# Patient Record
Sex: MALE | Race: WHITE | NOT HISPANIC OR LATINO | Employment: FULL TIME | URBAN - METROPOLITAN AREA
[De-identification: names, ages, dates, MRNs, and addresses within clinical notes are randomized per-mention and may not be internally consistent; named-entity substitution may affect disease eponyms.]

---

## 2017-01-04 ENCOUNTER — HOSPITAL ENCOUNTER (EMERGENCY)
Facility: CLINIC | Age: 62
Discharge: HOME | End: 2017-01-04
Attending: EMERGENCY MEDICINE | Admitting: EMERGENCY MEDICINE
Payer: COMMERCIAL

## 2017-01-04 ENCOUNTER — TELEPHONE (OUTPATIENT)
Dept: SURGERY | Facility: CLINIC | Age: 62
End: 2017-01-04

## 2017-01-04 VITALS
SYSTOLIC BLOOD PRESSURE: 124 MMHG | TEMPERATURE: 98.3 F | WEIGHT: 270 LBS | HEIGHT: 73 IN | RESPIRATION RATE: 20 BRPM | OXYGEN SATURATION: 94 % | BODY MASS INDEX: 35.78 KG/M2 | DIASTOLIC BLOOD PRESSURE: 82 MMHG | HEART RATE: 76 BPM

## 2017-01-04 DIAGNOSIS — R59.9 ENLARGED LYMPH NODES: Primary | ICD-10-CM

## 2017-01-04 DIAGNOSIS — R22.1 LOCALIZED SWELLING, MASS AND LUMP, NECK: ICD-10-CM

## 2017-01-04 LAB
ALBUMIN SERPL-MCNC: 3.7 G/DL (ref 3.4–5)
ALP SERPL-CCNC: 81 U/L (ref 40–150)
ALT SERPL W P-5'-P-CCNC: 44 U/L (ref 0–70)
ANION GAP SERPL CALCULATED.3IONS-SCNC: 9 MMOL/L (ref 3–14)
AST SERPL W P-5'-P-CCNC: 22 U/L (ref 0–45)
BASOPHILS # BLD AUTO: 0 10E9/L (ref 0–0.2)
BASOPHILS NFR BLD AUTO: 0.3 %
BILIRUB SERPL-MCNC: 1.6 MG/DL (ref 0.2–1.3)
BUN SERPL-MCNC: 18 MG/DL (ref 7–30)
CALCIUM SERPL-MCNC: 8.5 MG/DL (ref 8.5–10.1)
CHLORIDE SERPL-SCNC: 102 MMOL/L (ref 94–109)
CO2 SERPL-SCNC: 27 MMOL/L (ref 20–32)
CREAT SERPL-MCNC: 0.88 MG/DL (ref 0.66–1.25)
DIFFERENTIAL METHOD BLD: NORMAL
EOSINOPHIL # BLD AUTO: 0 10E9/L (ref 0–0.7)
EOSINOPHIL NFR BLD AUTO: 0.2 %
ERYTHROCYTE [DISTWIDTH] IN BLOOD BY AUTOMATED COUNT: 12.9 % (ref 10–15)
GFR SERPL CREATININE-BSD FRML MDRD: 87 ML/MIN/1.7M2
GLUCOSE SERPL-MCNC: 116 MG/DL (ref 70–99)
HCT VFR BLD AUTO: 42.1 % (ref 40–53)
HGB BLD-MCNC: 14.4 G/DL (ref 13.3–17.7)
IMM GRANULOCYTES # BLD: 0 10E9/L (ref 0–0.4)
IMM GRANULOCYTES NFR BLD: 0.2 %
INR PPP: 1.12 (ref 0.86–1.14)
LYMPHOCYTES # BLD AUTO: 1.5 10E9/L (ref 0.8–5.3)
LYMPHOCYTES NFR BLD AUTO: 15 %
MCH RBC QN AUTO: 31.6 PG (ref 26.5–33)
MCHC RBC AUTO-ENTMCNC: 34.2 G/DL (ref 31.5–36.5)
MCV RBC AUTO: 93 FL (ref 78–100)
MONOCYTES # BLD AUTO: 1 10E9/L (ref 0–1.3)
MONOCYTES NFR BLD AUTO: 9.6 %
NEUTROPHILS # BLD AUTO: 7.5 10E9/L (ref 1.6–8.3)
NEUTROPHILS NFR BLD AUTO: 74.7 %
NRBC # BLD AUTO: 0 10*3/UL
NRBC BLD AUTO-RTO: 0 /100
PLATELET # BLD AUTO: 268 10E9/L (ref 150–450)
POTASSIUM SERPL-SCNC: 3.5 MMOL/L (ref 3.4–5.3)
PROT SERPL-MCNC: 7.7 G/DL (ref 6.8–8.8)
RBC # BLD AUTO: 4.55 10E12/L (ref 4.4–5.9)
SODIUM SERPL-SCNC: 138 MMOL/L (ref 133–144)
WBC # BLD AUTO: 10 10E9/L (ref 4–11)

## 2017-01-04 PROCEDURE — 80053 COMPREHEN METABOLIC PANEL: CPT | Performed by: EMERGENCY MEDICINE

## 2017-01-04 PROCEDURE — 99284 EMERGENCY DEPT VISIT MOD MDM: CPT | Mod: Z6 | Performed by: EMERGENCY MEDICINE

## 2017-01-04 PROCEDURE — 85610 PROTHROMBIN TIME: CPT | Performed by: EMERGENCY MEDICINE

## 2017-01-04 PROCEDURE — 85025 COMPLETE CBC W/AUTO DIFF WBC: CPT | Performed by: EMERGENCY MEDICINE

## 2017-01-04 PROCEDURE — 99283 EMERGENCY DEPT VISIT LOW MDM: CPT | Mod: 25 | Performed by: EMERGENCY MEDICINE

## 2017-01-04 PROCEDURE — 31525 DX LARYNGOSCOPY EXCL NB: CPT | Performed by: EMERGENCY MEDICINE

## 2017-01-04 ASSESSMENT — ENCOUNTER SYMPTOMS
MYALGIAS: 0
VOMITING: 0
NAUSEA: 0
COUGH: 0
SHORTNESS OF BREATH: 0
FEVER: 0
ABDOMINAL PAIN: 0

## 2017-01-04 NOTE — CONSULTS
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 He has been following with Thoracic Surgery for this papillomatosis, but has not needed an ablative procedure since March of 2016. Today he presents to the ED from his PCP appointment for a large lump on his left neck. Patient reports that he just noticed the lumps in the last 5 days. He has had no associated symptoms, no fevers, cough, colds, weight loss, or sick contacts. These lumps do not hurt, and are not tender to touch, or draining anything.    He did have a CT scan back on 12/21/2016 with Thoracic Surgery to plan for his upcoming surgery on 1/9/2017, evaluating for is papillomatosis. There were two necrotic lymph nodes in the left neck at this time, but the patient reports he was not informed of this. Now he has ha larger mass, which prompted his PCP to get another CT when he was seen in clinic yesterday. This CT showed a large interval increase in the size of these two nodes, as well as two new nodes that are now enlarged in lower levels of his left neck. His PCP told him to make an ENT appointment for this week, and sent him out on augmentin. The patient called his Thoracic surgeon with the news, and was told to come to the ED for evaluation.     Past Medical History   Diagnosis Date     Hypertension      Laryngeal polyp      Tracheostomy in place      #6 Ruben     Thyroid disease      hypothyroidism     PONV (postoperative nausea and vomiting)      Pulmonary nodules      Papillomatosis of trachea        Past Surgical History   Procedure Laterality Date     Orthopedic surgery       left knee surgery     Orthopedic surgery       back surgery     Orthopedic surgery       bilateral shoulder  surgery     Laser ktp bronchoscopy, direct laryngoscopy, combined  4/11/2012     Procedure:COMBINED LASER KTP BRONCHOSCOPY, DIRECT LARYNGOSCOPY;  Direct Laryngoscopy, Bronchoscopy  with KTP Laser and Excision of Tracheal Papilloma *Latex Safe Room*; Surgeon:MEHUL FUENTES; Location:[facility]     Laser co2 laryngoscopy  6/6/2011     Procedure:LASER CO2 LARYNGOSCOPY; Micro Direct Laryngoscopy with Vocal Cord Stripping WITH CO2 LASER STANDBY.; Surgeon:MEHUL FUENTES; Location:[facility]     Laryngectomy  1982     Laser ktp bronchoscopy  7/25/2014     Procedure: LASER KTP BRONCHOSCOPY;  Surgeon: Mehul Fuentes MD;  Location: [facility]     Laser ktp bronchoscopy, direct laryngoscopy, combined N/A 4/8/2015     Procedure: COMBINED LASER KTP BRONCHOSCOPY, DIRECT LARYNGOSCOPY;  Surgeon: Mehul Fuentes MD;  Location: [facility]     Bronchoscopy flexible,l cryobiopsy N/A 1/7/2016     Procedure: BRONCHOSCOPY FLEXIBLE, CRYOBIOPSY;  Surgeon: Derik Fuentes MD;  Location: [facility]     Bronchoscopy flexible,l cryobiopsy N/A 1/28/2016     Procedure: BRONCHOSCOPY FLEXIBLE, CRYOBIOPSY;  Surgeon: Derik Fuentes MD;  Location: [facility]     Bronchoscopy flexible,l cryobiopsy N/A 3/14/2016     Procedure: BRONCHOSCOPY FLEXIBLE, CRYOBIOPSY;  Surgeon: Derik Fuentes MD;  Location: [facility] OR   History of thyroidectomy 25 years ago, unclear if nina- or total. Patient reports the pathology came back as benign.    Current Outpatient Prescriptions   Medication Sig Dispense Refill     AMLODIPINE BESYLATE PO Take 5 mg by mouth daily       Escitalopram Oxalate (LEXAPRO PO) Take 10 mg by mouth daily       METOPROLOL SUCCINATE ER PO Take 150 mg by mouth daily        levothyroxine (SYNTHROID, LEVOTHROID) 112 MCG tablet Take 224 mcg by mouth daily.       order for DME Equipment being ordered:    Trach supplies:   Tracheostomy tube 6 mm XLT cuffless (dispense 1) with disposable inner cannulas (dispense 40) Ref number  "[id number]  Thermovent Heat and moisture exchanger, ref [id number] (dispense 40)  14 French suction catheters for trach suctioning (dispense 1 Case (50) )      Treatment Diagnosis: Papilloma tracheal papillomatosis 1 Month 11     albuterol (2.5 MG/3ML) 0.083% nebulizer solution Take 1 vial (2.5 mg) by nebulization every 6 hours as needed for shortness of breath / dyspnea or wheezing 360 mL 0     order for DME Equipment being ordered: Nebulizer 1 Device 0     oxyCODONE (ROXICODONE) 5 MG immediate release tablet Take 1-2 tablets (5-10 mg) by mouth every 4 hours as needed for pain or other (Moderate to Severe) 20 tablet 0     order for DME Equipment being ordered: Suction machine and suction catheters for trach. Please dispense 1 suction machine and 30 catheters with 3 refills 30 Device 3     lisinopril-hydrochlorothiazide (PRINZIDE,ZESTORETIC) 20-25 MG per tablet Take 1 tablet by mouth daily.          No Known Allergies    Social History     Social History     Marital Status:      Spouse Name: N/A     Number of Children: N/A     Years of Education: N/A     Occupational History     Not on file.     Social History Main Topics     Smoking status: Never Smoker      Smokeless tobacco: Never Used     Alcohol Use: No     Drug Use: No     Sexual Activity: Not on file     Other Topics Concern     Not on file     Social History Narrative       No family history on file.    ROS: 12 point review of systems is negative unless noted in HPI.    PHYSICAL EXAM:  General: laying in bed, no acute distress  /80 mmHg  Pulse 76  Temp(Src) 98.3  F (36.8  C) (Oral)  Resp 20  Ht 1.854 m (6' 1\")  Wt 122.471 kg (270 lb)  BMI 35.63 kg/m2  SpO2 97%  HEAD: normocephalic, atraumatic  Face: symmetrical, CN VII intact bilaterally (HB 1). Sensation V1-V3 intact and equal bilaterally. Notable fullness at left jaw line.  Eyes: EOMI without spontaneous or gaze evoked nystagmus, PERRL, clear sclera  Ears: no tragal tenderness, external ear " canal open and clear bilaterally, TM clear on right, unable to assess 2/2 cerumen on left  Nose: no anterior drainage, intact and midline septum without perforation or hematoma   Mouth: moist, no ulcers, no jaw or tooth tenderness, tongue midline and symmetric  Oropharynx: tonsils within normal limits, uvula midline, no oropharyngeal erythema  Neck: trachea midline. Two large masses over left SCM close together in anterior triangle of neck. Non-tender to palpation.   Neuro: cranial nerves 2-12 grossly intact    FIBEROPTIC ENDOSCOPY:  Due to concern for unseen lesion in pharynx/larynx, fiberoptic laryngoscopy was indicated. After obtaining verbal consent, the fiberoptic laryngoscope was passed under endoscopic vision through the left nasal passage. The turbinates were normal. The inferior and middle meati were clear bilaterally without purulence, masses, or polyps. The R nasopharynx wall shows a possible polyp. The eustachian tubes were clear. The soft palate appeared normal with good mobility. The epiglottis was sharp, and the visualized portion of the vallecula was clear. The larynx was clear with good movement of L vocal cord. R vocal cord appeared more stiff, but there is obvious scaring from previous papilloma removal. The arytenoids were clear, and there was no pooling in the hypopharynx.  The  fiberoptic laryngoscope was also passed down his trach, which showed one pedunculated papilloma on the posterior wall of the trachea near the end of his trach. His trach was advanced in (as his ties were loose and there was room to advance), and the polyp was obscured behind the trach, leaving a clear open airway.     ROUTINE IP LABS (Last four results)  BMP  Recent Labs  Lab 01/04/17  1539      POTASSIUM 3.5   CHLORIDE 102   SHAJI 8.5   CO2 27   BUN 18   CR 0.88   *     CBC  Recent Labs  Lab 01/04/17  1539   WBC 10.0   RBC 4.55   HGB 14.4   HCT 42.1   MCV 93   MCH 31.6   MCHC 34.2   RDW 12.9         INR  Recent Labs  Lab 01/04/17  1539   INR 1.12       Imaging:  CT head[facility]neck [facility] 1/3/2017 [facility];   1. Since previous neck CT 12/21/2016, there has been interval increase in   the size of the necrotic left-sided level 2A and level 3 lymph nodes as   described above. In addition, there are several mild abnormally enlarged   enlarged left-sided level 4 and level 5 lymph nodes which are also new   since the previous neck CT. There is asymmetric prominence of the left   tonsillar fossa. Please correlate with direct visualization. ENT consult   and probable tissue sampling would be valuable.   2. There is extensive inflammatory soft tissue associated with the   necrotic left level 2A and 3 lymph nodes with the inflammatory changes   involving the soft tissues anterior to the left sternocleidomastoid,   posterior to the left submandibular gland and along the left carotid space   as well as the lateral left  space. Inferiorly, these   inflammatory soft tissues extend along the anterior left-sided soft tissues  down to the level of the thyroid cartilage. The findings are suspicious   for superimposed infectious process. No discrete drainable fluid   collections.  3. Stable 3 mm pleural-based nodule in the right lung apex.    CT head/neck from 12/21/2016;  Impression:  1. Two new necrotic lymphadenopathy in the left neck.  2. New 9 mm nodule in the right upper lung lobe.  3. Unchanged positioning of the tracheostomy tube with continued  thickening of the tracheal wall below the tracheostomy tube.  4. Nonspecific soft tissue thickening in the right carotid space  surrounding the carotid artery.     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 These are  most likely 2/2 infection or malignancy. The patient does not have infectious type symptoms, and malignancy must be ruled out. Having tracheal papillomatosis does increase his risk for squamous cell carcinoma.     -We recommend Ultrasound guided FNA, order placed for you in discharge navigator. The nodes appear to be behind his submandibular salivary gland and SCM, so it would likely be difficult to get a meaningful FNA without the use of U/S. Patient reports he has off of work this week, so hopefully we can expedite this for him, given the convenience for him and the rapid enlargement.  -Mr. Gayle should follow up in Dr. Diana's clinic next week, likely Wed or Friday.   -No need for further imaging at this time  -This is not a contraindication for surgery on Monday with Thoracic from our standpoint    Adriana Aaron MD PGY1  Otolaryngology-Head & Neck Surgery  Please page ENT with questions by dialing [phone] and entering job code [phone] when prompted.

## 2017-01-04 NOTE — CONSULTS
[facility] Surgery Consultation    Alex Gayle MRN# [id number]   Age: 61 year old YOB: 1955     Date of Admission:  1/4/2017    Date of Consult:   1/04/17    Reason for consult: Enlarging neck lymph nodes       Requesting service: ED                   Assessment and Plan:   Assessment:   60 y/o male with tracheal papillomatosis s/p tracheostomy and serial ablations here with enlarging neck lymph nodes over past 5 days. Imaging favoring infectious in etiology but no clinical sources of infection. No immediate concerns for airway compromise.        Plan:   - No indication for urgent intervention for airway  - Defer to ENT for workup of enlarging lymph nodes  - May postpone ablation scheduled on 1/9 pending etiology of enlarging lymph nodes    Discussed with fellow, Dr. Bradley            Chief Complaint:   Enlarging neck lymph nodes         History of Present Illness:   60 y/o male with tracheal papillomatosis s/p tracheostomy and serial ablations here with enlarging neck lymph nodes over past 5 days. Initially with 2 necrotic lymph nodes found on CT on 12/21/2016 but asymptomatic clinically. 5 days ago started having left sided submandibular swelling that initially improved but then got worse over past few days. Denies any pain/tenderness over the area. CT 1/2/17 showed enlarging necrotic nodes as well as other new enlarged nodes favoring infectious etiology. Denies any sick contacts, recent illness. No fever/chills, congestion, headache. Denies SOB or chest pain. No history of salivary stones. No rashes. Immunizations up to date. No other sign of symptoms of infection.          Past Medical History:     Past Medical History   Diagnosis Date     Hypertension      Laryngeal polyp      Tracheostomy in place      #6 Ruben     Thyroid disease      hypothyroidism     PONV (postoperative nausea and vomiting)      Pulmonary nodules      Papillomatosis of trachea              Past Surgical History:      Past Surgical History   Procedure Laterality Date     Orthopedic surgery       left knee surgery     Orthopedic surgery       back surgery     Orthopedic surgery       bilateral shoulder surgery     Laser ktp bronchoscopy, direct laryngoscopy, combined  4/11/2012     Procedure:COMBINED LASER KTP BRONCHOSCOPY, DIRECT LARYNGOSCOPY;  Direct Laryngoscopy, Bronchoscopy  with KTP Laser and Excision of Tracheal Papilloma *Latex Safe Room*; Surgeon:MEHUL FUENTES; Location:[facility]     Laser co2 laryngoscopy  6/6/2011     Procedure:LASER CO2 LARYNGOSCOPY; Micro Direct Laryngoscopy with Vocal Cord Stripping WITH CO2 LASER STANDBY.; Surgeon:MEHUL FUENTES; Location:[facility]     Laryngectomy  1982     Laser ktp bronchoscopy  7/25/2014     Procedure: LASER KTP BRONCHOSCOPY;  Surgeon: Mehul Fuentes MD;  Location: [facility]     Laser ktp bronchoscopy, direct laryngoscopy, combined N/A 4/8/2015     Procedure: COMBINED LASER KTP BRONCHOSCOPY, DIRECT LARYNGOSCOPY;  Surgeon: Mehul Fuentes MD;  Location: [facility] OR     Bronchoscopy flexible,l cryobiopsy N/A 1/7/2016     Procedure: BRONCHOSCOPY FLEXIBLE, CRYOBIOPSY;  Surgeon: Derik Fuentes MD;  Location: [facility]     Bronchoscopy flexible,l cryobiopsy N/A 1/28/2016     Procedure: BRONCHOSCOPY FLEXIBLE, CRYOBIOPSY;  Surgeon: Derik Fuentes MD;  Location: [facility]     Bronchoscopy flexible,l cryobiopsy N/A 3/14/2016     Procedure: BRONCHOSCOPY FLEXIBLE, CRYOBIOPSY;  Surgeon: Derik Fuentes MD;  Location: [facility] OR             Social History:     Social History   Substance Use Topics     Smoking status: Never Smoker      Smokeless tobacco: Never Used     Alcohol Use: No             Family History:   No family history on file.             Allergies:   No Known Allergies          Medications:     No current facility-administered medications for this encounter.     Current Outpatient Prescriptions   Medication Sig     AMLODIPINE BESYLATE PO Take 5 mg  by mouth daily     Escitalopram Oxalate (LEXAPRO PO) Take 10 mg by mouth daily     METOPROLOL SUCCINATE ER PO Take 150 mg by mouth daily      levothyroxine (SYNTHROID, LEVOTHROID) 112 MCG tablet Take 224 mcg by mouth daily.     order for DME Equipment being ordered:    Trach supplies:   Tracheostomy tube 6 mm XLT cuffless (dispense 1) with disposable inner cannulas (dispense 40) Ref number [id number]  Thermovent Heat and moisture exchanger, ref [id number] (dispense 40)  14 French suction catheters for trach suctioning (dispense 1 Case (50) )      Treatment Diagnosis: Papilloma tracheal papillomatosis     albuterol (2.5 MG/3ML) 0.083% nebulizer solution Take 1 vial (2.5 mg) by nebulization every 6 hours as needed for shortness of breath / dyspnea or wheezing     order for DME Equipment being ordered: Nebulizer     oxyCODONE (ROXICODONE) 5 MG immediate release tablet Take 1-2 tablets (5-10 mg) by mouth every 4 hours as needed for pain or other (Moderate to Severe)     order for DME Equipment being ordered: Suction machine and suction catheters for trach. Please dispense 1 suction machine and 30 catheters with 3 refills     lisinopril-hydrochlorothiazide (PRINZIDE,ZESTORETIC) 20-25 MG per tablet Take 1 tablet by mouth daily.     Facility-Administered Medications Ordered in Other Encounters   Medication     ondansetron (ZOFRAN) injection               Review of Systems:   Negative except for mentioned in HPI          Physical Exam:   All vitals have been reviewed  Temp:  [98.3  F (36.8  C)] 98.3  F (36.8  C)  Pulse:  [76] 76  Resp:  [20] 20  BP: (124)/(80-82) 124/82 mmHg  SpO2:  [97 %] 97 %  No intake or output data in the 24 hours ending 01/04/17 0234  Physical Exam:  Gen: NAD, AOx3  HEENT: large left sided submandibular mass, non-tender, no overlying skin changes. Right sided nodes palpable but not significantly enlarged. Midline trach in place.  Chest: RRR, NLB on RA  Abdomen: S, ND, NT          Data:   All laboratory data  reviewed    Results:  BMP  Recent Labs  Lab 01/04/17  1539      POTASSIUM 3.5   CHLORIDE 102   CO2 27   BUN 18   CR 0.88   *     CBC  Recent Labs  Lab 01/04/17  1539   WBC 10.0   HGB 14.4        LFT  Recent Labs  Lab 01/04/17  1539   AST 22   ALT 44   ALKPHOS 81   BILITOTAL 1.6*   ALBUMIN 3.7   INR 1.12       Recent Labs  Lab 01/04/17  1539   *       Imaging:  CT Soft Tissue Neck 12/21/16:  Impression:  1. Two new necrotic lymphadenopathy in the left neck.  2. New 9 mm nodule in the right upper lung lobe.  3. Unchanged positioning of the tracheostomy tube with continued  thickening of the tracheal wall below the tracheostomy tube.  4. Nonspecific soft tissue thickening in the right carotid space  surrounding the carotid artery. Allograft    [Access Center: New necrotic lymph nodes in the neck and a new lung  Nodule]    CT Soft Tissue Neck 01/03/17 at [facility]:  CONCLUSION:  1. Since previous neck CT 12/21/2016, there has been interval increase in   the size of the necrotic left-sided level 2A and level 3 lymph nodes as   described above. In addition, there are several mild abnormally enlarged   enlarged left-sided level 4 and level 5 lymph nodes which are also new   since the previous neck CT. There is asymmetric prominence of the left   tonsillar fossa. Please correlate with direct visualization. ENT consult   and probable tissue sampling would be valuable.   2. There is extensive inflammatory soft tissue associated with the   necrotic left level 2A and 3 lymph nodes with the inflammatory changes   involving the soft tissues anterior to the left sternocleidomastoid,   posterior to the left submandibular gland and along the left carotid space   as well as the lateral left  space. Inferiorly, these   inflammatory soft tissues extend along the anterior left-sided soft tissues  down to the level of the thyroid cartilage. The findings are suspicious   for superimposed infectious  process. No discrete drainable fluid   collections.  3. Stable 3 mm pleural-based nodule in the right lung apex.       Teo Seymour MD

## 2017-01-04 NOTE — TELEPHONE ENCOUNTER
Patient's wife called me today.  Farzad is scheduled to have a bronchoscopy on Monday for evaluation and treatment of airway papillomas.  Also an EGD because he's been having symptoms of dysphagia.      Recent CT done prior to procedure showed some enlarging adenopathy in the in the neck.  Reviewed with Dr. Fuentes, plan was to schedule an appt for patient with ENT.    Shefali called today because Farzad has significant swelling on the side of his neck and under his chin.  She said these areas are larger than golf balls. He saw his PCP who started him on abx and advised going to [facility].  They are on their way.  ENT has been notified of possible consult to evaluate neck adenopathy.

## 2017-01-04 NOTE — ED NOTES
Pt arrives with left neck and throat swelling that is getting worse. Was seen here on 12/21/16 for a Soft tissue CT scan. Pt was also seen at [facility] yesterday for an MRI.   Has Surgery scheduled on 01/09/16 for Papillomas.

## 2017-01-04 NOTE — ED AVS SNAPSHOT
[facility], [place], Emergency Department    [address] [place] [address]    Phone:  [phone]                                       Alex Gayle   MRN: [id number]    Department:  [facility], Emergency Department   Date of Visit:  1/4/2017           After Visit Summary Signature Page     I have received my discharge instructions, and my questions have been answered. I have discussed any challenges I see with this plan with the nurse or doctor.    ..........................................................................................................................................  Patient/Patient Representative Signature      ..........................................................................................................................................  Patient Representative Print Name and Relationship to Patient    ..................................................               ................................................  Date                                            Time    ..........................................................................................................................................  Reviewed by Signature/Title    ...................................................              ..............................................  Date                                                            Time

## 2017-01-04 NOTE — ED PROVIDER NOTES
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 Patient has a long history of tracheal papillomatosis and follows with Dr. Fuentes. He is scheduled for routine EGD's for his papillomatosis. His most recent procedure was in March, 2016 and he is scheduled for upcoming EGD with cryoablation of papillomas with Dr. Fuentes on Monday (5 days from now). Patient states about a week ago he noticed some swelling on the left side of his neck. He states the swelling has gotten progressively worse since that time. Patient was seen at [facility] yesterday and had a CT soft tissue of the neck which showed several enlarging necrotic left sided lymph nodes compared to previous CT on 12/21 (impression below). Patient states he was also prescribed Augmentin at that time. Patient states the swelling has gotten progressively worse since yesterday. He denies ever having had this problem before. Patient states he has not seen Dr. Fuentes or ENT for this complaint, though states he contacted Dr. Fuentes's office today and was instructed to present to the ED. Patient denies any shortness of breath, cough or chest pain. No recent fevers, body aches, abdominal pain, nausea, vomiting or other complaints of swelling.      CT Soft Tissue Neck 12/21/16:  Impression:  1. Two new necrotic lymphadenopathy in the left neck.  2. New 9 mm nodule in the right upper lung lobe.  3. Unchanged positioning of the tracheostomy tube with continued  thickening of the tracheal wall below the tracheostomy tube.  4. Nonspecific soft tissue thickening in the right carotid space  surrounding the carotid artery. Allograft    [Access Center: New necrotic lymph nodes in the neck and a new  lung  Nodule]    CT Soft Tissue Neck 01/03/17 at [facility]:  CONCLUSION:  1. Since previous neck CT 12/21/2016, there has been interval increase in   the size of the necrotic left-sided level 2A and level 3 lymph nodes as   described above. In addition, there are several mild abnormally enlarged   enlarged left-sided level 4 and level 5 lymph nodes which are also new   since the previous neck CT. There is asymmetric prominence of the left   tonsillar fossa. Please correlate with direct visualization. ENT consult   and probable tissue sampling would be valuable.   2. There is extensive inflammatory soft tissue associated with the   necrotic left level 2A and 3 lymph nodes with the inflammatory changes   involving the soft tissues anterior to the left sternocleidomastoid,   posterior to the left submandibular gland and along the left carotid space   as well as the lateral left  space. Inferiorly, these   inflammatory soft tissues extend along the anterior left-sided soft tissues  down to the level of the thyroid cartilage. The findings are suspicious   for superimposed infectious process. No discrete drainable fluid   collections.  3. Stable 3 mm pleural-based nodule in the right lung apex.     I have reviewed the Medications, Allergies, Past Medical and Surgical History, and Social History in the [employer] system.    Past Medical History   Diagnosis Date     Hypertension      Laryngeal polyp      Tracheostomy in place      #6 Ruben     Thyroid disease      hypothyroidism     PONV (postoperative nausea and vomiting)      Pulmonary nodules      Papillomatosis of trachea        Past Surgical History   Procedure Laterality Date     Orthopedic surgery       left knee surgery     Orthopedic surgery       back surgery     Orthopedic surgery       bilateral shoulder surgery     Laser ktp bronchoscopy, direct laryngoscopy, combined  4/11/2012     Procedure:COMBINED LASER KTP BRONCHOSCOPY, DIRECT LARYNGOSCOPY;  Direct  Laryngoscopy, Bronchoscopy  with KTP Laser and Excision of Tracheal Papilloma *Late[facility] Safe Room*; Surgeon:MEHUL FUENTES; Location:[facility] OR     Laser co2 laryngoscopy  6/6/2011     Procedure:LASER CO2 LARYNGOSCOPY; Micro Direct Laryngoscopy with Vocal Cord Stripping WITH CO2 LASER STANDBY.; Surgeon:MEHUL FUENTES; Location:[facility] OR     Laryngectomy  1982     Laser ktp bronchoscopy  7/25/2014     Procedure: LASER KTP BRONCHOSCOPY;  Surgeon: Mehul Fuentes MD;  Location: [facility] OR     Laser ktp bronchoscopy, direct laryngoscopy, combined N/A 4/8/2015     Procedure: COMBINED LASER KTP BRONCHOSCOPY, DIRECT LARYNGOSCOPY;  Surgeon: Mehul Fuentes MD;  Location: [facility] OR     Bronchoscopy flexible,l cryobiopsy N/A 1/7/2016     Procedure: BRONCHOSCOPY FLEXIBLE, CRYOBIOPSY;  Surgeon: Derik Fuentes MD;  Location: [facility] OR     Bronchoscopy flexible,l cryobiopsy N/A 1/28/2016     Procedure: BRONCHOSCOPY FLEXIBLE, CRYOBIOPSY;  Surgeon: Derik Fuentes MD;  Location: [facility] OR     Bronchoscopy flexible,l cryobiopsy N/A 3/14/2016     Procedure: BRONCHOSCOPY FLEXIBLE, CRYOBIOPSY;  Surgeon: Derik Fuentes MD;  Location: [facility] OR       No family history on file.    Social History   Substance Use Topics     Smoking status: Never Smoker      Smokeless tobacco: Never Used     Alcohol Use: No     No current facility-administered medications for this encounter.     Current Outpatient Prescriptions   Medication     AMLODIPINE BESYLATE PO     Escitalopram Oxalate (LEXAPRO PO)     METOPROLOL SUCCINATE ER PO     levothyroxine (SYNTHROID, LEVOTHROID) 112 MCG tablet     order for DME     albuterol (2.5 MG/3ML) 0.083% nebulizer solution     order for DME     oxyCODONE (ROXICODONE) 5 MG immediate release tablet     order for DME     lisinopril-hydrochlorothiazide (PRINZIDE,ZESTORETIC) 20-25 MG per tablet     Facility-Administered Medications Ordered in Other Encounters   Medication     ondansetron (ZOFRAN)  "injection      No Known Allergies    Review of Systems   Constitutional: Negative for fever.   HENT:        Left sided neck and throat swelling   Respiratory: Negative for cough and shortness of breath.    Cardiovascular: Negative for chest pain and leg swelling.   Gastrointestinal: Negative for nausea, vomiting and abdominal pain.   Musculoskeletal: Negative for myalgias.   All other systems reviewed and are negative.      Physical Exam   BP: 124/80 mmHg  Pulse: 76  Temp: 98.3  F (36.8  C)  Resp: 20  Height: 185.4 cm (6' 1\")  Weight: 122.471 kg (270 lb)  SpO2: 97 %  Physical Exam   Constitutional: He is oriented to person, place, and time. He appears well-developed and well-nourished.   HENT:   Head: Normocephalic and atraumatic.   Neck: Normal range of motion.   Midline tracheostomy;  Large left sided lateral neck mass; non tender to touch; non palpable below chin but some fullness;    Cardiovascular: Normal rate, regular rhythm and normal heart sounds.    Pulmonary/Chest: Effort normal. No respiratory distress. He has no wheezes.   Abdominal: Soft. He exhibits no distension. There is no tenderness. There is no rebound.   Musculoskeletal: He exhibits no edema or tenderness.   Neurological: He is alert and oriented to person, place, and time.   Skin: Skin is warm.   Psychiatric: He has a normal mood and affect. His behavior is normal. Thought content normal.       ED Course   Procedures   3:39 PM  The patient was seen and examined by Dr. Aponte in Room ED17.              Critical Care time:  none         Results for orders placed or performed during the hospital encounter of 01/04/17   CBC with platelets differential   Result Value Ref Range    WBC 10.0 4.0 - 11.0 10e9/L    RBC Count 4.55 4.4 - 5.9 10e12/L    Hemoglobin 14.4 13.3 - 17.7 g/dL    Hematocrit 42.1 40.0 - 53.0 %    MCV 93 78 - 100 fl    MCH 31.6 26.5 - 33.0 pg    MCHC 34.2 31.5 - 36.5 g/dL    RDW 12.9 10.0 - 15.0 %    Platelet Count 268 150 - 450 10e9/L "    Diff Method Automated Method     % Neutrophils 74.7 %    % Lymphocytes 15.0 %    % Monocytes 9.6 %    % Eosinophils 0.2 %    % Basophils 0.3 %    % Immature Granulocytes 0.2 %    Nucleated RBCs 0 0 /100    Absolute Neutrophil 7.5 1.6 - 8.3 10e9/L    Absolute Lymphocytes 1.5 0.8 - 5.3 10e9/L    Absolute Monocytes 1.0 0.0 - 1.3 10e9/L    Absolute Eosinophils 0.0 0.0 - 0.7 10e9/L    Absolute Basophils 0.0 0.0 - 0.2 10e9/L    Abs Immature Granulocytes 0.0 0 - 0.4 10e9/L    Absolute Nucleated RBC 0.0    INR   Result Value Ref Range    INR 1.12 0.86 - 1.14   Comprehensive metabolic panel   Result Value Ref Range    Sodium 138 133 - 144 mmol/L    Potassium 3.5 3.4 - 5.3 mmol/L    Chloride 102 94 - 109 mmol/L    Carbon Dioxide 27 20 - 32 mmol/L    Anion Gap 9 3 - 14 mmol/L    Glucose 116 (H) 70 - 99 mg/dL    Urea Nitrogen 18 7 - 30 mg/dL    Creatinine 0.88 0.66 - 1.25 mg/dL    GFR Estimate 87 >60 mL/min/1.7m2    GFR Estimate If Black >90   GFR Calc   >60 mL/min/1.7m2    Calcium 8.5 8.5 - 10.1 mg/dL    Bilirubin Total 1.6 (H) 0.2 - 1.3 mg/dL    Albumin 3.7 3.4 - 5.0 g/dL    Protein Total 7.7 6.8 - 8.8 g/dL    Alkaline Phosphatase 81 40 - 150 U/L    ALT 44 0 - 70 U/L    AST 22 0 - 45 U/L     Medications - No data to display           Labs Ordered and Resulted from Time of ED Arrival Up to the Time of Departure from the ED - No data to display    Assessments & Plan (with Medical Decision Making)  Patient is a 61 year old male with a history of recurrent papillomas who follows closely with Dr. Fuentes of thoracic surgery who presented to the ER due to new swelling and fullness in his left lateral neck. Patient was seen by the thoracic surgery service who does not recommend any intervention at this time. They agreed with following through on the date of surgery on the 9th. Patient was also seen by ENT including the attending. They reviewed patient's MRI that was done yesterday at [facility]. At this  time they are not worried about his airway, there is not any emergency procedure that needs to be performed. They do recommend follow up next week for an FNA biopsy of his neck mass. Patient is well-appearing with no signs of any respiratory distress. Patient will be discharged home with instructions to follow-up as recommended. Patient stable for discharge.      This part of the medical record was transcribed by Bogdan Calderon, Medical Scribe, from a dictation done by Vinita Aponte MD.    I have reviewed the nursing notes.  I have reviewed the findings, diagnosis, plan and need for follow up with the patient.    New Prescriptions    No medications on file       Final diagnoses:   Localized swelling, mass and lump, neck   I, Roxann Song, am serving as a trained medical scribe to document services personally performed by Vinita Aponte MD, based on the provider's statements to me.      IVinita MD, was physically present and have reviewed and verified the accuracy of this note documented by Roxann Song.       1/4/2017   [facility], [place], EMERGENCY DEPARTMENT      Vinita Aponte MD  01/04/17 [id number]

## 2017-01-04 NOTE — ED AVS SNAPSHOT
[facility], Emergency Department    [address] [address]    Phone:  [phone]                                       Alex Gayle   MRN: [id number]    Department:  [facility], Emergency Department   Date of Visit:  1/4/2017           Patient Information     Date Of Birth          1955        Your diagnoses for this visit were:     Enlarged lymph nodes     Localized swelling, mass and lump, neck        You were seen by Vinita Aponte MD.        Discharge Instructions       Please make an appointment to follow up with [facility] (phone: [phone]) in 3-5 days as recommended to follow up for biopsy and removal.  Return to the ER if you develop symptoms of worsening breathing.         24 Hour Appointment Hotline       To make an appointment at any [facility] clinic, call [phone] ([phone]). If you don't have a family doctor or clinic, we will help you find one. [facility] clinics are conveniently located to serve the needs of you and your family.          ED Discharge Orders     US Biopsy Thyroid Fine Needle Aspiration                    Review of your medicines      Our records show that you are taking the medicines listed below. If these are incorrect, please call your family doctor or clinic.        Dose / Directions Last dose taken    albuterol (2.5 MG/3ML) 0.083% neb solution   Dose:  1 vial   Quantity:  360 mL        Take 1 vial (2.5 mg) by nebulization every 6 hours as needed for shortness of breath / dyspnea or wheezing   Refills:  0        AMLODIPINE BESYLATE PO   Dose:  5 mg        Take 5 mg by mouth daily   Refills:  0        levothyroxine 112 MCG tablet   Commonly known as:  SYNTHROID/LEVOTHROID   Dose:  224 mcg        Take 224 mcg by mouth daily.   Refills:  0        LEXAPRO PO   Dose:  10 mg        Take 10 mg by mouth daily   Refills:  0        lisinopril-hydrochlorothiazide 20-25 MG per tablet   Commonly known as:   PRINZIDE/ZESTORETIC   Dose:  1 tablet        Take 1 tablet by mouth daily.   Refills:  0        METOPROLOL SUCCINATE ER PO   Dose:  150 mg        Take 150 mg by mouth daily   Refills:  0        * order for DME   Quantity:  30 Device        Equipment being ordered: Suction machine and suction catheters for trach. Please dispense 1 suction machine and 30 catheters with 3 refills   Refills:  3        * order for DME   Quantity:  1 Device        Equipment being ordered: Nebulizer   Refills:  0        * order for DME   Quantity:  1 Month        Equipment being ordered:   Trach supplies:  Tracheostomy tube 6 mm XLT cuffless (dispense 1) with disposable inner cannulas (dispense 40) Ref number [id number] Thermovent Heat and moisture exchanger, ref 191378 (dispense 40) 14 French suction catheters for trach suctioning (dispense 1 Case (50) )   Treatment Diagnosis: Papilloma tracheal papillomatosis   Refills:  11        oxyCODONE 5 MG IR tablet   Commonly known as:  ROXICODONE   Dose:  5-10 mg   Quantity:  20 tablet        Take 1-2 tablets (5-10 mg) by mouth every 4 hours as needed for pain or other (Moderate to Severe)   Refills:  0        * Notice:  This list has 3 medication(s) that are the same as other medications prescribed for you. Read the directions carefully, and ask your doctor or other care provider to review them with you.            Procedures and tests performed during your visit     CBC with platelets differential    Comprehensive metabolic panel    INR      Orders Needing Specimen Collection     None      Pending Results     No orders found from 1/3/2017 to 1/5/2017.            Pending Culture Results     No orders found from 1/3/2017 to 1/5/2017.            Thank you for choosing [facility]       Thank you for choosing [facility] for your care. Our goal is always to provide you with excellent care. Hearing back from our patients is one way we can continue to improve our services. Please take a few minutes to  "complete the written survey that you may receive in the mail after you visit with us. Thank you!        [employer] Information     [employer]nikBot lets you send messages to your doctor, view your test results, renew your prescriptions, schedule appointments and more. To sign up, go to [email or web address] . Click on \"Log in\" on the left side of the screen, which will take you to the Welcome page. Then click on \"Sign up Now\" on the right side of the page.     You will be asked to enter the access code listed below, as well as some personal information. Please follow the directions to create your username and password.     Your access code is: [email or web address]  Expires: 3/2/2017 11:31 AM     Your access code will  in 90 days. If you need help or a new code, please call your [facility] clinic or [phone].        Care EveryWhere ID     This is your Care EveryWhere ID. This could be used by other organizations to access your [facility] medical records  [id number]        After Visit Summary       This is your record. Keep this with you and show to your community pharmacist(s) and doctor(s) at your next visit.                  "

## 2017-01-04 NOTE — ED NOTES
Bed: IN02  Expected date: 1/4/17  Expected time: 1:02 PM  Means of arrival:   Comments:  Alex Gayle  [id number]  61 male  Tracheal and airway stenosis  Trach  Last procedure 3/2016  Beginning of December swallowing issues and more stridor. Scheduled for a procedure on MOnday. On CT, done on 12/21, noted left neck lymph node enlargement.   Today - per wife, last couple of days has had swelling of neck and chin, bigger than golf balls and head has increased in size.   Saw his primary   and started on abx  Will need thoracic and ENT consults

## 2017-01-05 NOTE — DISCHARGE INSTRUCTIONS
Please make an appointment to follow up with [facility] (phone: [phone]) in 3-5 days as recommended to follow up for biopsy and removal.  Return to the ER if you develop symptoms of worsening breathing.

## 2017-11-28 ENCOUNTER — OFFICE VISIT (OUTPATIENT)
Dept: OTOLARYNGOLOGY | Facility: CLINIC | Age: 62
End: 2017-11-28

## 2017-11-28 DIAGNOSIS — D14.2 TRACHEAL PAPILLOMATOSIS: ICD-10-CM

## 2017-11-28 DIAGNOSIS — J39.8 TRACHEAL STENOSIS: ICD-10-CM

## 2017-11-28 DIAGNOSIS — R49.0 DYSPHONIA: Primary | ICD-10-CM

## 2017-11-28 DIAGNOSIS — D14.1 LARYNGEAL PAPILLOMATOSIS: ICD-10-CM

## 2017-11-28 ASSESSMENT — PAIN SCALES - GENERAL: PAINLEVEL: NO PAIN (0)

## 2017-11-28 NOTE — LETTER
[facility] EAR NOSE AND THROAT  [address] [facility] [address]          November 28, 2017    RE:  Alex Gayle                                                                                                                                                       [address] [place] [id number]            To whom it may concern:    Alex ARABELLAjonatan was seen by Dr Ricardo Tirado in the ENT Clinic at the [facility], today, 11/28/17.    Please contact me with questions/concerns: [phone]    Sincerely,        Ricardo Tirado MD

## 2017-11-28 NOTE — NURSING NOTE
Chief Complaint   Patient presents with     RECHECK     llaryngeal papilloma      Ernie Bains LPN

## 2017-11-28 NOTE — MR AVS SNAPSHOT
After Visit Summary   2017    Alex Gayle    MRN: [id number]           Patient Information     Date Of Birth          1955        Visit Information        Provider Department      2017 8:15 AM Ricardo Tirado MD [facility] Nose [facility]        Today's Diagnoses     Dysphonia    -  1      Care Instructions    Plan of care:  You can expect a call from Sarita to schedule your surgery  Clinic contact information:  1. To schedule an appointment call [phone], option 1  2. To talk to the Triage RN call [phone], option 3  3. If you need to speak to Barbara or get a message to your doctor on a Friday, call the triage RN  4. BarbaraRN: [phone]  5. Surgery scheduling:      Richa Mattson: [phone]      Sarita Alvarez: [phone]  6. Fax: [phone]  7. [phone]    Pre-op reminders:  1. Complete pre-op H+P within 30 days of surgery (recommend pre-op appointment 2 weeks prior to surgery date).   2.  needed on day of surgery.   3.Discuss all medications, including blood-thinning medications with PCP at pre-op appointment (Coumadin, Plavix, Aspirin, Ibuprofen/Motrin, Vitamin E and Fish Oil), which may need to be discontinued 7 days prior to surgery date.   4. Nothing to eat or drink after midnight the night before surgery.   5. Take shower or bath the morning of surgery and remove deodorant, cologne, scented lotion, makeup, nail polish and jewelry.             Follow-ups after your visit        Who to contact     Please call your clinic at [phone] to:    Ask questions about your health    Make or cancel appointments    Discuss your medicines    Learn about your test results    Speak to your doctor   If you have compliments or concerns about an experience at your clinic, or if you wish to file a complaint, please contact [facility] Patient Relations at [phone] or email us at [email or web address]          Additional Information About Your Visit        Biolase Information     Biolase is an electronic gateway that provides easy, online access to your medical records. With Biolase, you can request a clinic appointment, read your test results, renew a prescription or communicate with your care team.     To sign up for Biolase visit the website at [email or web address]   You will be asked to enter the access code listed below, as well as some personal information. Please follow the directions to create your username and password.     Your access code is: [email or web address]  Expires: 2018  6:31 AM     Your access code will  in 90 days. If you need help or a new code, please contact your [facility] or call [phone] for assistance.        Care EveryWhere ID     This is your Care EveryWhere ID. This could be used by other organizations to access your [facility] medical records  [id number]         Blood Pressure from Last 3 Encounters:   17 124/82   03/15/16 137/68   16 108/74    Weight from Last 3 Encounters:   17 122.5 kg (270 lb)   16 121 kg (266 lb 12.1 oz)   16 120.1 kg (264 lb 12.4 oz)              We Performed the Following     IMAGESTREAM RECORDING ORDER        Primary Care Provider    None Specified       No primary provider on file.        Equal Access to Services     [facility] TARIK : Hadii aad ku hadasho Soomaali, waaxda luqadaha, qaybta kaalmada adeegyada, amor garcia . So [facility] [phone].    ATENCIÓN: Si habla español, tiene a carlson disposición servicios gratuitos de asistencia lingüística. Llame al [phone].    We comply with applicable federal civil rights laws and [place] laws. We do not discriminate on the basis of race, color, national origin, age, disability, sex, sexual orientation, or gender identity.            Thank you!     Thank you for choosing [facility] E[facility] AND THROAT  for your care. Our  goal is always to provide you with excellent care. Hearing back from our patients is one way we can continue to improve our services. Please take a few minutes to complete the written survey that you may receive in the mail after your visit with us. Thank you!             Your Updated Medication List - Protect others around you: Learn how to safely use, store and throw away your medicines at www.disposemymeds.org.          This list is accurate as of: 11/28/17  9:12 AM.  Always use your most recent med list.                   Brand Name Dispense Instructions for use Diagnosis    AMLODIPINE BESYLATE PO      Take 5 mg by mouth daily        levothyroxine 112 MCG tablet    SYNTHROID/LEVOTHROID     Take 224 mcg by mouth daily.        LEXAPRO PO      Take 10 mg by mouth daily        lisinopril-hydrochlorothiazide 20-25 MG per tablet    PRINZIDE/ZESTORETIC     Take 1 tablet by mouth daily.        METOPROLOL SUCCINATE ER PO      Take 150 mg by mouth daily        * order for DME     30 Device    Equipment being ordered: Suction machine and suction catheters for trach. Please dispense 1 suction machine and 30 catheters with 3 refills    Laryngeal papillomatosis       * order for DME     1 Device    Equipment being ordered: Nebulizer    Wheezing       * order for DME     1 Month    Equipment being ordered:   Trach supplies:  Tracheostomy tube 6 mm XLT cuffless (dispense 1) with disposable inner cannulas (dispense 40) Ref number 60XLTUD Thermovent Heat and moisture exchanger, ref 241033 (dispense 40) 14 Spanish suction catheters for trach suctioning (dispense 1 Case (50) )   Treatment Diagnosis: Papilloma tracheal papillomatosis    Tracheal papillomatosis       * Notice:  This list has 3 medication(s) that are the same as other medications prescribed for you. Read the directions carefully, and ask your doctor or other care provider to review them with you.

## 2017-11-28 NOTE — LETTER
"11/28/2017       RE: Alex Gayle  [address]  [facility] [address]     Dear Colleague,    Thank you for referring your patient, Alex Gayle, to the [facility] EAR NOSE AND THROAT at [facility]. Please see a copy of my visit note below.        HISTORY OF PRESENT ILLNESS: Alex Gayle is a 62 year old male with a history of laryngeal papilloma. He was originally diagnosed as a cancer and headache supraglottic laryngectomy approximately 30 years ago. He had a tracheotomy at the time of the supraglottic laryngectomy. This has remained in place due to supraglottic scarring. 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 He was then seen by Dr. Salazar at [facility] who took him to the operating room and 2012.  Follow-up with Dr. Salazar in November 2015. 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 He was seen by Dr. 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 He also consulted with Dr. de la cruz regarding resection and this was felt not to be his best option. He had trouble with his Ruben tracheotomy and he was changed to a Shiley XLT distal internal diameter 5.0 outer diameter 11.0 and Lynx 95 tracheotomy. He has done well with this trach able to change it quite easily.    Over the last 2-3 months he has noted some slight difficulties breathing and over the last month some mild difficulty swallowing. He is here for evaluation and recommendation        PAST MEDICAL HISTORY:   Past Medical History:   Diagnosis Date     Hypertension      Laryngeal polyp      Papillomatosis of trachea      PONV (postoperative nausea and vomiting)     \"better now using less narcotics     Pulmonary nodules      Thyroid disease     hypothyroidism     Tracheostomy in place (H)     #6 Ruben       PAST SURGICAL HISTORY:   Past Surgical History:   Procedure " Laterality Date     BRONCHOSCOPY FLEXIBLE,L CRYOBIOPSY N/A 1/7/2016    Procedure: BRONCHOSCOPY FLEXIBLE, CRYOBIOPSY;  Surgeon: Derik Fuentes MD;  Location: [facility] OR     BRONCHOSCOPY FLEXIBLE,L CRYOBIOPSY N/A 1/28/2016    Procedure: BRONCHOSCOPY FLEXIBLE, CRYOBIOPSY;  Surgeon: Derik Fuentes MD;  Location: [facility] OR     BRONCHOSCOPY FLEXIBLE,L CRYOBIOPSY N/A 3/14/2016    Procedure: BRONCHOSCOPY FLEXIBLE, CRYOBIOPSY;  Surgeon: Derik Fuentes MD;  Location: [facility] OR     LARYNGECTOMY  1982     LASER CO2 LARYNGOSCOPY  6/6/2011    Procedure:LASER CO2 LARYNGOSCOPY; Micro Direct Laryngoscopy with Vocal Cord Stripping WITH CO2 LASER STANDBY.; Surgeon:MEHUL FUENTES; Location:[facility] OR     LASER KTP BRONCHOSCOPY  7/25/2014    Procedure: LASER KTP BRONCHOSCOPY;  Surgeon: Mehul Fuentes MD;  Location: [facility] OR     LASER KTP BRONCHOSCOPY, DIRECT LARYNGOSCOPY, COMBINED  4/11/2012    Procedure:COMBINED LASER KTP BRONCHOSCOPY, DIRECT LARYNGOSCOPY;  Direct Laryngoscopy, Bronchoscopy  with KTP Laser and Excision of Tracheal Papilloma *Latex Safe Room*; Surgeon:MEHUL FUENTES; Location:[facility] OR     LASER KTP BRONCHOSCOPY, DIRECT LARYNGOSCOPY, COMBINED N/A 4/8/2015    Procedure: COMBINED LASER KTP BRONCHOSCOPY, DIRECT LARYNGOSCOPY;  Surgeon: Mehul Fuentes MD;  Location: [facility] OR     ORTHOPEDIC SURGERY      left knee surgery     ORTHOPEDIC SURGERY      back surgery     ORTHOPEDIC SURGERY      bilateral shoulder surgery       FAMILY HISTORY: No family history on file.    SOCIAL HISTORY:   Social History   Substance Use Topics     Smoking status: Never Smoker     Smokeless tobacco: Never Used     Alcohol use No       REVIEW OF SYSTEMS: Ten point review of systems was performed and is negative except for:   [facility] ROS 11/28/2017   Ears, Nose, Throat Trouble swallowing, Hoarseness   Cardiopulmonary Cough        ALLERGIES: Lisinopril    MEDICATIONS:   Current Outpatient Prescriptions   Medication Sig  Dispense Refill     AMLODIPINE BESYLATE PO Take 5 mg by mouth daily       order for DME Equipment being ordered:    Trach supplies:   Tracheostomy tube 6 mm XLT cuffless (dispense 1) with disposable inner cannulas (dispense 40) Ref number [id number]  Thermovent Heat and moisture exchanger, ref [id number] (dispense 40)  14 Maldivian suction catheters for trach suctioning (dispense 1 Case (50) )      Treatment Diagnosis: Papilloma tracheal papillomatosis 1 Month 11     order for DME Equipment being ordered: Nebulizer 1 Device 0     order for DME Equipment being ordered: Suction machine and suction catheters for trach. Please dispense 1 suction machine and 30 catheters with 3 refills 30 Device 3     Escitalopram Oxalate (LEXAPRO PO) Take 10 mg by mouth daily       METOPROLOL SUCCINATE ER PO Take 150 mg by mouth daily        levothyroxine (SYNTHROID, LEVOTHROID) 112 MCG tablet Take 224 mcg by mouth daily.       lisinopril-hydrochlorothiazide (PRINZIDE,ZESTORETIC) 20-25 MG per tablet Take 1 tablet by mouth daily.           PHYSICAL EXAMINATION:  He  is awake, alert and in no apparent distress.    His tympanic membranes are clear and intact bilaterally. External auditory canals are clear.  Nasal exam shows a mild septal deviation without obstruction.  Examination of the oral cavity shows no suspicious lesions.  There is symmetric movement of the tongue and soft palate.    The oropharynx is clear.  His neck is supple without significant adenopathy. There is a Shiley #5 XLT tracheotomy tube in place. The stoma is clear. There are postsurgical changes in his neck which are well healed. Pulse is regular.  Upper airway is resticted.  Cranial nerves II-XII are grossly intact.       PROCEDURE: A flexible laryngoscopy  was performed.  Informed consent was obtained and a time out was performed. 3% lidocaine and 0.25% phenylephrine was sprayed into the nasal cavity and allowed 3 to 5 minutes for effect. The scope was passed through the right  sided nostril. Examination of the hypopharynx showed papillomatous lesions along the base of his tongue. He is status post a supraglottic laryngectomy and there is a supraglottic scar present. There is a small smooth mass over the left vocal fold which is minimally obstructing and seen only partially through the area of supraglottic stenosis. The hypopharynx is otherwise clear.    Oropharyngeal papilloma        Larynx        The scope was then passed through his tracheostomy stoma both with the trach tube in place and after trach tube. There was a small to moderate amount of papillomatous disease at the distal tip of the tracheotomy tube. There is collapse of the trachea in an A-frame configuration down to the level of the tracheotomy tube. No distal tracheal papillomatous lesions were seen.    Tracheal papilloma          IMPRESSIONPLAN: My impression is recurrent oropharyngeal and tracheal papillomas. I'm recommending we go to the operating room for a direct laryngoscopy and KTP laser excision of the base of tongue papillomas. In addition we will do a fiberoptic laryngoscopy through his tracheostomy site for a fiberoptic KTP laser excision of his tracheal papillomatosis. He is interested in proceeding and we will schedule at the time of his convenience.        This report was dictated using Dragon voice recognition software. Please excuse any mistakes or omissions.     Again, thank you for allowing me to participate in the care of your patient.      Sincerely,    Ricardo Tirado MD

## 2017-11-28 NOTE — PATIENT INSTRUCTIONS
Plan of care:  You can expect a call from Sarita to schedule your surgery  Clinic contact information:  1. To schedule an appointment call [phone], option 1  2. To talk to the Triage RN call [phone], option 3  3. If you need to speak to Barbara or get a message to your doctor on a Friday, call the triage RN  4. BarbaraRN: [phone]  5. Surgery scheduling:      Richa Mattson: [phone]      Sarita Alvarez: [phone]  6. Fax: [phone]  7. [phone]    Pre-op reminders:  1. Complete pre-op H+P within 30 days of surgery (recommend pre-op appointment 2 weeks prior to surgery date).   2.  needed on day of surgery.   3.Discuss all medications, including blood-thinning medications with PCP at pre-op appointment (Coumadin, Plavix, Aspirin, Ibuprofen/Motrin, Vitamin E and Fish Oil), which may need to be discontinued 7 days prior to surgery date.   4. Nothing to eat or drink after midnight the night before surgery.   5. Take shower or bath the morning of surgery and remove deodorant, cologne, scented lotion, makeup, nail polish and jewelry.

## 2017-11-28 NOTE — PROGRESS NOTES
"    HISTORY OF PRESENT ILLNESS: Alex Gayle is a 62 year old male with a history of laryngeal papilloma. He was originally diagnosed as a cancer and had a supraglottic laryngectomy approximately 30 years ago. He had a tracheotomy at the time of the supraglottic laryngectomy. This has remained in place due to supraglottic scarring. 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 He was then seen by Dr. Salazar at [facility] who took him to the operating room in 2012.  Follow-up with Dr. Salazar in November 2015. 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 He was seen by Dr. 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 He also consulted with Dr. Salazar regarding resection and this was felt not to be his best option. He had trouble with his Ruben tracheotomy and he was changed to a Shiley XLT distal internal diameter 6.0 mm, outer diameter 11.0 mm and Length 95 mm tracheotomy. He has done well with this trach and able to change it quite easily.    Over the last 2-3 months he has noted some slight difficulties breathing and over the last month some mild difficulty swallowing. He is here for evaluation and recommendation        PAST MEDICAL HISTORY:   Past Medical History:   Diagnosis Date     Hypertension      Laryngeal polyp      Papillomatosis of trachea      PONV (postoperative nausea and vomiting)     \"better now using less narcotics     Pulmonary nodules      Thyroid disease     hypothyroidism     Tracheostomy in place (H)     #6 Ruben       PAST SURGICAL HISTORY:   Past Surgical History:   Procedure Laterality Date     BRONCHOSCOPY FLEXIBLE,L CRYOBIOPSY N/A 1/7/2016    Procedure: BRONCHOSCOPY FLEXIBLE, CRYOBIOPSY;  Surgeon: Derik Fuentes MD;  Location:  OR     BRONCHOSCOPY FLEXIBLE,L CRYOBIOPSY N/A 1/28/2016    Procedure: BRONCHOSCOPY FLEXIBLE, CRYOBIOPSY;  Surgeon: " Derik Fuentes MD;  Location: [facility] OR     BRONCHOSCOPY FLEXIBLE,L CRYOBIOPSY N/A 3/14/2016    Procedure: BRONCHOSCOPY FLEXIBLE, CRYOBIOPSY;  Surgeon: Derik Fuentes MD;  Location: [facility] OR     LARYNGECTOMY  1982     LASER CO2 LARYNGOSCOPY  6/6/2011    Procedure:LASER CO2 LARYNGOSCOPY; Micro Direct Laryngoscopy with Vocal Cord Stripping WITH CO2 LASER STANDBY.; Surgeon:MEHUL FUENTES; Location[facility] OR     LASER KTP BRONCHOSCOPY  7/25/2014    Procedure: LASER KTP BRONCHOSCOPY;  Surgeon: Mehul Fuentes MD;  Location: [facility] OR     LASER KTP BRONCHOSCOPY, DIRECT LARYNGOSCOPY, COMBINED  4/11/2012    Procedure:COMBINED LASER KTP BRONCHOSCOPY, DIRECT LARYNGOSCOPY;  Direct Laryngoscopy, Bronchoscopy  with KTP Laser and Excision of Tracheal Papilloma *Latex Safe Room*; Surgeon:MEHUL FUENTES; Location[facility] OR     LASER KTP BRONCHOSCOPY, DIRECT LARYNGOSCOPY, COMBINED N/A 4/8/2015    Procedure: COMBINED LASER KTP BRONCHOSCOPY, DIRECT LARYNGOSCOPY;  Surgeon: Mehul Fuentes MD;  Location: [facility] OR     ORTHOPEDIC SURGERY      left knee surgery     ORTHOPEDIC SURGERY      back surgery     ORTHOPEDIC SURGERY      bilateral shoulder surgery       FAMILY HISTORY: No family history on file.    SOCIAL HISTORY:   Social History   Substance Use Topics     Smoking status: Never Smoker     Smokeless tobacco: Never Used     Alcohol use No       REVIEW OF SYSTEMS: Ten point review of systems was performed and is negative except for:    ENT ROS 11/28/2017   Ears, Nose, Throat Trouble swallowing, Hoarseness   Cardiopulmonary Cough        ALLERGIES: Lisinopril    MEDICATIONS:   Current Outpatient Prescriptions   Medication Sig Dispense Refill     AMLODIPINE BESYLATE PO Take 5 mg by mouth daily       order for DME Equipment being ordered:    Trach supplies:   Tracheostomy tube 6 mm XLT cuffless (dispense 1) with disposable inner cannulas (dispense 40) Ref number [id number]  Thermovent Heat and moisture exchanger,  ref [id number] (dispense 40)  14 Georgian suction catheters for trach suctioning (dispense 1 Case (50) )      Treatment Diagnosis: Papilloma tracheal papillomatosis 1 Month 11     order for DME Equipment being ordered: Nebulizer 1 Device 0     order for DME Equipment being ordered: Suction machine and suction catheters for trach. Please dispense 1 suction machine and 30 catheters with 3 refills 30 Device 3     Escitalopram Oxalate (LEXAPRO PO) Take 10 mg by mouth daily       METOPROLOL SUCCINATE ER PO Take 150 mg by mouth daily        levothyroxine (SYNTHROID, LEVOTHROID) 112 MCG tablet Take 224 mcg by mouth daily.       lisinopril-hydrochlorothiazide (PRINZIDE,ZESTORETIC) 20-25 MG per tablet Take 1 tablet by mouth daily.           PHYSICAL EXAMINATION:  He  is awake, alert and in no apparent distress.    His tympanic membranes are clear and intact bilaterally. External auditory canals are clear.  Nasal exam shows a mild septal deviation without obstruction.  Examination of the oral cavity shows no suspicious lesions.  There is symmetric movement of the tongue and soft palate.    The oropharynx is clear.  His neck is supple without significant adenopathy. There is a Shiley #5 XLT tracheotomy tube in place. The stoma is clear. There are postsurgical changes in his neck which are well healed. Pulse is regular.  Upper airway is resticted.  Cranial nerves II-XII are grossly intact.       PROCEDURE: A flexible laryngoscopy  was performed.  Informed consent was obtained and a time out was performed. 3% lidocaine and 0.25% phenylephrine was sprayed into the nasal cavity and allowed 3 to 5 minutes for effect. The scope was passed through the right sided nostril. Examination of the hypopharynx showed papillomatous lesions along the base of his tongue. He is status post a supraglottic laryngectomy and there is a supraglottic scar present. There is a small smooth mass over the left vocal fold which is minimally obstructing and seen  only partially through the area of supraglottic stenosis. The hypopharynx is otherwise clear.    Oropharyngeal papilloma        Larynx        The scope was then passed through his tracheostomy stoma both with the trach tube in place and after trach tube removal. There was a small to moderate amount of papillomatous disease at the distal tip of the tracheotomy tube. There is collapse of the trachea in an A-frame configuration down to the level of the tracheotomy tube. No distal tracheal papillomatous lesions were seen.    Tracheal papilloma          IMPRESSIONPLAN: My impression is recurrent oropharyngeal and tracheal papillomas. I am recommending we go to the operating room for a direct laryngoscopy and KTP laser excision of the base of tongue papillomas. In addition we will do a fiberoptic laryngoscopy through his tracheostomy site for a fiberoptic KTP laser excision of his tracheal papillomatosis. He is interested in proceeding and we will schedule at the time of his convenience.        This report was dictated using Dragon voice recognition software. Please excuse any mistakes or omissions.

## 2017-11-28 NOTE — NURSING NOTE
Relevant Diagnosis: Laryngeal and tracheal papilloma  Teaching Topic: Surgery: KTP laser of papilloma  Person(s) involved in teaching:  Patient     Teaching Concerns Addressed:  Pre op teaching included the need for an H&P, NPO status pre op, hospital routines, expected recovery, activity  restrictions, antimicrobial scrub, s/s of infection, pain control methods and the importance of follow up appointments.  The patient voiced an understanding of all instructions and will call with questions.     Motivation Level:  Asks Questions:   Yes  Eager to Learn:   Yes  Cooperative:   Yes  Receptive (willing/able to accept information):   Yes     Patient  demonstrates understanding of the following:  Reason for the appointment, diagnosis and treatment plan:   Yes  Knowledge of proper use of medications and conditions for which they are ordered (with special attention to potential side effects or drug interactions):   Yes  Which situations necessitate calling provider and whom to contact:   Yes        Proper use and care of  (medical equip, care aids, etc.):   NA  Nutritional needs and diet plan:   Yes  Pain management techniques:   Yes  Patient instructed on hand hygiene:  Yes  How and/when to access community resources:   NA     Infection Prevention:  Patient   demonstrates understanding of the following:  Surgical procedure site care taught   Signs and symptoms of infection taught Yes       Instructional Materials Used/Given: Pre op booklet.

## 2017-12-28 ENCOUNTER — CARE COORDINATION (OUTPATIENT)
Dept: OTOLARYNGOLOGY | Facility: CLINIC | Age: 62
End: 2017-12-28

## 2018-02-15 ENCOUNTER — TELEPHONE (OUTPATIENT)
Dept: OTOLARYNGOLOGY | Facility: CLINIC | Age: 63
End: 2018-02-15

## 2018-02-15 NOTE — TELEPHONE ENCOUNTER
Patient called to reschedule his surgery that he cancelled in December because he was ill.  He was rescheduled on 2/23 with Dr Tirado.

## 2018-02-22 ENCOUNTER — TELEPHONE (OUTPATIENT)
Dept: OTOLARYNGOLOGY | Facility: CLINIC | Age: 63
End: 2018-02-22
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
## 2018-03-26 ENCOUNTER — TELEPHONE (OUTPATIENT)
Dept: OTOLARYNGOLOGY | Facility: CLINIC | Age: 63
End: 2018-03-26

## 2018-03-26 NOTE — TELEPHONE ENCOUNTER
Patient called in to reschedule his surgery that was cancelled on 12/29/17 and 2/23/18.  Patient will get another preop H&P.  KTP Laser was ordered through [employer] [id number] confirm # [id number] per Maria E for surgery date 4/13/18 with Dr Tirado.

## 2018-04-12 ENCOUNTER — ANESTHESIA EVENT (OUTPATIENT)
Dept: SURGERY | Facility: CLINIC | Age: 63
End: 2018-04-12
Payer: COMMERCIAL

## 2018-04-13 ENCOUNTER — ANESTHESIA (OUTPATIENT)
Dept: SURGERY | Facility: CLINIC | Age: 63
End: 2018-04-13
Payer: COMMERCIAL

## 2018-04-13 ENCOUNTER — HOSPITAL ENCOUNTER (OUTPATIENT)
Facility: CLINIC | Age: 63
Discharge: HOME | End: 2018-04-13
Attending: OTOLARYNGOLOGY | Admitting: OTOLARYNGOLOGY
Payer: COMMERCIAL

## 2018-04-13 ENCOUNTER — SURGERY (OUTPATIENT)
Age: 63
End: 2018-04-13

## 2018-04-13 VITALS
BODY MASS INDEX: 36.52 KG/M2 | HEART RATE: 64 BPM | HEIGHT: 73 IN | SYSTOLIC BLOOD PRESSURE: 123 MMHG | TEMPERATURE: 98 F | RESPIRATION RATE: 16 BRPM | OXYGEN SATURATION: 98 % | DIASTOLIC BLOOD PRESSURE: 82 MMHG | WEIGHT: 275.57 LBS

## 2018-04-13 DIAGNOSIS — D14.2 TRACHEAL PAPILLOMATOSIS: Primary | ICD-10-CM

## 2018-04-13 DIAGNOSIS — D14.1 LARYNGEAL PAPILLOMATOSIS: ICD-10-CM

## 2018-04-13 LAB
GLUCOSE BLDC GLUCOMTR-MCNC: 185 MG/DL (ref 70–99)
HGB BLD-MCNC: 14.7 G/DL (ref 13.3–17.7)
INTERPRETATION ECG - MUSE: NORMAL
POTASSIUM SERPL-SCNC: 3.3 MMOL/L (ref 3.4–5.3)

## 2018-04-13 PROCEDURE — C9399 UNCLASSIFIED DRUGS OR BIOLOG: HCPCS | Performed by: NURSE ANESTHETIST, CERTIFIED REGISTERED

## 2018-04-13 PROCEDURE — 36000062 ZZH SURGERY LEVEL 4 1ST 30 MIN - UMMC: Performed by: OTOLARYNGOLOGY

## 2018-04-13 PROCEDURE — 88305 TISSUE EXAM BY PATHOLOGIST: CPT | Performed by: OTOLARYNGOLOGY

## 2018-04-13 PROCEDURE — 25000128 H RX IP 250 OP 636: Performed by: NURSE ANESTHETIST, CERTIFIED REGISTERED

## 2018-04-13 PROCEDURE — 40000170 ZZH STATISTIC PRE-PROCEDURE ASSESSMENT II: Performed by: OTOLARYNGOLOGY

## 2018-04-13 PROCEDURE — 25000125 ZZHC RX 250: Performed by: OTOLARYNGOLOGY

## 2018-04-13 PROCEDURE — 37000009 ZZH ANESTHESIA TECHNICAL FEE, EACH ADDTL 15 MIN: Performed by: OTOLARYNGOLOGY

## 2018-04-13 PROCEDURE — 85018 HEMOGLOBIN: CPT | Performed by: ANESTHESIOLOGY

## 2018-04-13 PROCEDURE — 36415 COLL VENOUS BLD VENIPUNCTURE: CPT | Performed by: ANESTHESIOLOGY

## 2018-04-13 PROCEDURE — 25000128 H RX IP 250 OP 636: Performed by: ANESTHESIOLOGY

## 2018-04-13 PROCEDURE — 40000065 ZZH STATISTIC EKG NON-CHARGEABLE

## 2018-04-13 PROCEDURE — 93010 ELECTROCARDIOGRAM REPORT: CPT | Performed by: INTERNAL MEDICINE

## 2018-04-13 PROCEDURE — 71000027 ZZH RECOVERY PHASE 2 EACH 15 MINS: Performed by: OTOLARYNGOLOGY

## 2018-04-13 PROCEDURE — 25000125 ZZHC RX 250: Performed by: NURSE ANESTHETIST, CERTIFIED REGISTERED

## 2018-04-13 PROCEDURE — 84132 ASSAY OF SERUM POTASSIUM: CPT | Performed by: ANESTHESIOLOGY

## 2018-04-13 PROCEDURE — 88342 IMHCHEM/IMCYTCHM 1ST ANTB: CPT | Performed by: OTOLARYNGOLOGY

## 2018-04-13 PROCEDURE — 71000014 ZZH RECOVERY PHASE 1 LEVEL 2 FIRST HR: Performed by: OTOLARYNGOLOGY

## 2018-04-13 PROCEDURE — 37000008 ZZH ANESTHESIA TECHNICAL FEE, 1ST 30 MIN: Performed by: OTOLARYNGOLOGY

## 2018-04-13 PROCEDURE — 27210794 ZZH OR GENERAL SUPPLY STERILE: Performed by: OTOLARYNGOLOGY

## 2018-04-13 PROCEDURE — 82962 GLUCOSE BLOOD TEST: CPT

## 2018-04-13 PROCEDURE — 36000064 ZZH SURGERY LEVEL 4 EA 15 ADDTL MIN - UMMC: Performed by: OTOLARYNGOLOGY

## 2018-04-13 RX ORDER — CEFAZOLIN SODIUM 1 G/50ML
3 SOLUTION INTRAVENOUS
Status: DISCONTINUED | OUTPATIENT
Start: 2018-04-13 | End: 2018-04-13

## 2018-04-13 RX ORDER — ONDANSETRON 4 MG/1
4 TABLET, ORALLY DISINTEGRATING ORAL EVERY 30 MIN PRN
Status: DISCONTINUED | OUTPATIENT
Start: 2018-04-13 | End: 2018-04-13 | Stop reason: HOSPADM

## 2018-04-13 RX ORDER — ONDANSETRON 2 MG/ML
4 INJECTION INTRAMUSCULAR; INTRAVENOUS EVERY 30 MIN PRN
Status: DISCONTINUED | OUTPATIENT
Start: 2018-04-13 | End: 2018-04-13 | Stop reason: HOSPADM

## 2018-04-13 RX ORDER — LIDOCAINE HYDROCHLORIDE 20 MG/ML
INJECTION, SOLUTION INFILTRATION; PERINEURAL PRN
Status: DISCONTINUED | OUTPATIENT
Start: 2018-04-13 | End: 2018-04-13

## 2018-04-13 RX ORDER — SODIUM CHLORIDE, SODIUM LACTATE, POTASSIUM CHLORIDE, CALCIUM CHLORIDE 600; 310; 30; 20 MG/100ML; MG/100ML; MG/100ML; MG/100ML
INJECTION, SOLUTION INTRAVENOUS CONTINUOUS
Status: DISCONTINUED | OUTPATIENT
Start: 2018-04-13 | End: 2018-04-13 | Stop reason: HOSPADM

## 2018-04-13 RX ORDER — KETAMINE HYDROCHLORIDE 10 MG/ML
INJECTION INTRAMUSCULAR; INTRAVENOUS PRN
Status: DISCONTINUED | OUTPATIENT
Start: 2018-04-13 | End: 2018-04-13

## 2018-04-13 RX ORDER — ACETAMINOPHEN 10 MG/ML
1000 INJECTION, SOLUTION INTRAVENOUS ONCE
Status: COMPLETED | OUTPATIENT
Start: 2018-04-13 | End: 2018-04-13

## 2018-04-13 RX ORDER — PROPOFOL 10 MG/ML
INJECTION, EMULSION INTRAVENOUS PRN
Status: DISCONTINUED | OUTPATIENT
Start: 2018-04-13 | End: 2018-04-13

## 2018-04-13 RX ORDER — CEFAZOLIN SODIUM 2 G/100ML
2 INJECTION, SOLUTION INTRAVENOUS
Status: DISCONTINUED | OUTPATIENT
Start: 2018-04-13 | End: 2018-04-13

## 2018-04-13 RX ORDER — PROPOFOL 10 MG/ML
INJECTION, EMULSION INTRAVENOUS CONTINUOUS PRN
Status: DISCONTINUED | OUTPATIENT
Start: 2018-04-13 | End: 2018-04-13

## 2018-04-13 RX ORDER — SODIUM CHLORIDE, SODIUM LACTATE, POTASSIUM CHLORIDE, CALCIUM CHLORIDE 600; 310; 30; 20 MG/100ML; MG/100ML; MG/100ML; MG/100ML
INJECTION, SOLUTION INTRAVENOUS CONTINUOUS PRN
Status: DISCONTINUED | OUTPATIENT
Start: 2018-04-13 | End: 2018-04-13

## 2018-04-13 RX ORDER — CEFAZOLIN SODIUM 1 G/50ML
3 SOLUTION INTRAVENOUS
Status: DISCONTINUED | OUTPATIENT
Start: 2018-04-13 | End: 2018-04-13 | Stop reason: HOSPADM

## 2018-04-13 RX ORDER — NALOXONE HYDROCHLORIDE 0.4 MG/ML
.1-.4 INJECTION, SOLUTION INTRAMUSCULAR; INTRAVENOUS; SUBCUTANEOUS
Status: DISCONTINUED | OUTPATIENT
Start: 2018-04-13 | End: 2018-04-13 | Stop reason: HOSPADM

## 2018-04-13 RX ORDER — HYDROCODONE BITARTRATE AND ACETAMINOPHEN 5; 325 MG/1; MG/1
1-2 TABLET ORAL EVERY 4 HOURS PRN
Qty: 50 TABLET | Refills: 0 | Status: SHIPPED | OUTPATIENT
Start: 2018-04-13

## 2018-04-13 RX ORDER — DEXAMETHASONE SODIUM PHOSPHATE 4 MG/ML
INJECTION, SOLUTION INTRA-ARTICULAR; INTRALESIONAL; INTRAMUSCULAR; INTRAVENOUS; SOFT TISSUE PRN
Status: DISCONTINUED | OUTPATIENT
Start: 2018-04-13 | End: 2018-04-13

## 2018-04-13 RX ADMIN — Medication 3 G: at 07:55

## 2018-04-13 RX ADMIN — PROPOFOL 125 MCG/KG/MIN: 10 INJECTION, EMULSION INTRAVENOUS at 07:46

## 2018-04-13 RX ADMIN — PROPOFOL 50 MG: 10 INJECTION, EMULSION INTRAVENOUS at 07:44

## 2018-04-13 RX ADMIN — PROPOFOL 50 MG: 10 INJECTION, EMULSION INTRAVENOUS at 07:43

## 2018-04-13 RX ADMIN — ROCURONIUM BROMIDE 50 MG: 10 INJECTION INTRAVENOUS at 07:45

## 2018-04-13 RX ADMIN — DEXAMETHASONE SODIUM PHOSPHATE 8 MG: 4 INJECTION, SOLUTION INTRA-ARTICULAR; INTRALESIONAL; INTRAMUSCULAR; INTRAVENOUS; SOFT TISSUE at 08:03

## 2018-04-13 RX ADMIN — ROCURONIUM BROMIDE 10 MG: 10 INJECTION INTRAVENOUS at 08:26

## 2018-04-13 RX ADMIN — KETAMINE HCL-NACL SOLN PREF SY 50 MG/5ML-0.9% (10MG/ML) 25 MG: 10 SOLUTION PREFILLED SYRINGE at 07:50

## 2018-04-13 RX ADMIN — ONDANSETRON 4 MG: 2 INJECTION INTRAMUSCULAR; INTRAVENOUS at 09:40

## 2018-04-13 RX ADMIN — PROPOFOL 30 MG: 10 INJECTION, EMULSION INTRAVENOUS at 09:35

## 2018-04-13 RX ADMIN — PHENYLEPHRINE HYDROCHLORIDE 100 MCG: 10 INJECTION, SOLUTION INTRAMUSCULAR; INTRAVENOUS; SUBCUTANEOUS at 07:58

## 2018-04-13 RX ADMIN — COCAINE HYDROCHLORIDE 3 ML: 40 SOLUTION TOPICAL at 08:15

## 2018-04-13 RX ADMIN — ROCURONIUM BROMIDE 20 MG: 10 INJECTION INTRAVENOUS at 08:21

## 2018-04-13 RX ADMIN — KETAMINE HCL-NACL SOLN PREF SY 50 MG/5ML-0.9% (10MG/ML) 25 MG: 10 SOLUTION PREFILLED SYRINGE at 08:33

## 2018-04-13 RX ADMIN — SODIUM CHLORIDE, POTASSIUM CHLORIDE, SODIUM LACTATE AND CALCIUM CHLORIDE: 600; 310; 30; 20 INJECTION, SOLUTION INTRAVENOUS at 07:31

## 2018-04-13 RX ADMIN — ROCURONIUM BROMIDE 10 MG: 10 INJECTION INTRAVENOUS at 08:57

## 2018-04-13 RX ADMIN — PHENYLEPHRINE HYDROCHLORIDE 200 MCG: 10 INJECTION, SOLUTION INTRAMUSCULAR; INTRAVENOUS; SUBCUTANEOUS at 08:04

## 2018-04-13 RX ADMIN — ACETAMINOPHEN 1000 MG: 10 INJECTION, SOLUTION INTRAVENOUS at 08:26

## 2018-04-13 RX ADMIN — LIDOCAINE HYDROCHLORIDE 80 MG: 20 INJECTION, SOLUTION INFILTRATION; PERINEURAL at 07:42

## 2018-04-13 RX ADMIN — PROPOFOL 30 MG: 10 INJECTION, EMULSION INTRAVENOUS at 07:46

## 2018-04-13 RX ADMIN — PHENYLEPHRINE HYDROCHLORIDE 0.2 MCG/KG/MIN: 10 INJECTION, SOLUTION INTRAMUSCULAR; INTRAVENOUS; SUBCUTANEOUS at 08:47

## 2018-04-13 RX ADMIN — PHENYLEPHRINE HYDROCHLORIDE 300 MCG: 10 INJECTION, SOLUTION INTRAMUSCULAR; INTRAVENOUS; SUBCUTANEOUS at 07:52

## 2018-04-13 RX ADMIN — PHENYLEPHRINE HYDROCHLORIDE 200 MCG: 10 INJECTION, SOLUTION INTRAMUSCULAR; INTRAVENOUS; SUBCUTANEOUS at 08:37

## 2018-04-13 RX ADMIN — SUGAMMADEX 200 MG: 100 INJECTION, SOLUTION INTRAVENOUS at 09:45

## 2018-04-13 RX ADMIN — ROCURONIUM BROMIDE 10 MG: 10 INJECTION INTRAVENOUS at 09:35

## 2018-04-13 NOTE — IP AVS SNAPSHOT
Post Anesthesia Care Unit [address] [address]    Phone:  [phone]                                       After Visit Summary   4/13/2018    Alex Gayle    MRN: [id number]           After Visit Summary Signature Page     I have received my discharge instructions, and my questions have been answered. I have discussed any challenges I see with this plan with the nurse or doctor.    ..........................................................................................................................................  Patient/Patient Representative Signature      ..........................................................................................................................................  Patient Representative Print Name and Relationship to Patient    ..................................................               ................................................  Date                                            Time    ..........................................................................................................................................  Reviewed by Signature/Title    ...................................................              ..............................................  Date                                                            Time

## 2018-04-13 NOTE — IP AVS SNAPSHOT
MRN:[id number]                      After Visit Summary   4/13/2018    Alex Gayle    MRN: [id number]           Thank you!     Thank you for choosing [facility] for your care. Our goal is always to provide you with excellent care. Hearing back from our patients is one way we can continue to improve our services. Please take a few minutes to complete the written survey that you may receive in the mail after you visit with us. Thank you!        Patient Information     Date Of Birth          1955        About your hospital stay     You were admitted on:  April 13, 2018 You last received care in the:  Post Anesthesia Care Unit [facility]    You were discharged on:  April 13, 2018       Who to Call     For medical emergencies, please call 911.  For non-urgent questions about your medical care, please call your primary care provider or clinic, [phone]  For questions related to your surgery, please call your surgery clinic        Attending Provider     Provider Ricardo Chavira MD Otolaryngology       Primary Care Provider Office Phone # Fax #    Gino Gamez [phone]      After Care Instructions     Diet Instructions       Follow a soft diet for 2 weeks. No chips or hard candy. Stick to soft foods such as potatoes, soups, ice cream. After two weeks you can advance to a regular diet            Discharge Instructions       Patient to follow up with surgeon in 3-4 weeks            Discharge Instructions - Lifting restrictions       Lifting Restrictions 15 pounds for two weeks            No Alcohol       For 24 hours following procedure            No driving or operating machinery       until the day after procedure and while taking narcotics                  Further instructions from your care team       [facility]  Same-Day Surgery   Adult Discharge Orders & Instructions     For 24 hours after  surgery    1. Get plenty of rest.  A responsible adult must stay with you for at least 24 hours after you leave the hospital.   2. Do not drive or use heavy equipment.  If you have weakness or tingling, don't drive or use heavy equipment until this feeling goes away.  3. Do not drink alcohol.  4. Avoid strenuous or risky activities.  Ask for help when climbing stairs.   5. You may feel lightheaded.  IF so, sit for a few minutes before standing.  Have someone help you get up.   6. If you have nausea (feel sick to your stomach): Drink only clear liquids such as apple juice, ginger ale, broth or 7-Up.  Rest may also help.  Be sure to drink enough fluids.  Move to a regular diet as you feel able.  7. You may have a slight fever. Call the doctor if your fever is over 100 F (37.7 C) (taken under the tongue) or lasts longer than 24 hours.  8. You may have a dry mouth, a sore throat, muscle aches or trouble sleeping.  These should go away after 24 hours.  9. Do not make important or legal decisions.   Call your doctor for any of the followin.  Signs of infection (fever, growing tenderness at the surgery site, a large amount of drainage or bleeding, severe pain, foul-smelling drainage, redness, swelling).    2. It has been over 8 to 10 hours since surgery and you are still not able to urinate (pass water).    3.  Headache for over 24 hours.    4.  Numbness, tingling or weakness the day after surgery (if you had spinal anesthesia).  To contact a doctor, call _____Dr Tirado's office at [phone]________ or:    X   [phone] and ask for the resident on call for   _______ENT_______________________________________ (answered 24 hours a day)  X   Emergency Department:    [facility]: [phone]           Tips for taking pain medications  To get the best pain relief possible , remember these points:      Take pain medications as directed, before pain becomes severe      Pain medication can upset your stomach:  taking it with food may help      Constipation is a common side effect of pain medication. Drink plenty of  Fluids      Eat foods high in fiber. Take a stool softener  if recommended by your doctor or  Pharmacist.        Do not drink alcohol, drive or operate machinery while taking pain medications.      Ask about other ways to control pain, such as with heat, ice or relaxation.    Direct Laryngoscopy with Bronchoscopy    Laryngoscopy and bronchoscopy are two procedures that may be done together. They let the healthcare provider see inside the air passages in the throat and lungs. A laryngoscopy looks at the throat and larynx, or vocal cords. Bronchoscopy looks at the airways including the trachea (windpipe), bronchi, and bronchioles. These procedures can be used to diagnose and treat certain problems. They can also be used to remove objects stuck in your throat or airways. A tissue sample may be taken for testing (biopsy). And certain problems, like cysts or scarring, can be treated. Your healthcare provider will tell you more about your procedure based on why it is being done.   Preparing for the procedure  Prepare for the procedure as you have been instructed. Be sure to tell your healthcare provider about all medicines you take. This includes over-the-counter drugs. It also includes herbs and other supplements. You may need to stop taking some or all of them before surgery. Your healthcare provider will tell you what to stop. Also, follow any directions you re given for not eating or drinking before surgery.  The day of the procedure  The procedure takes 15 to 60 minutes. Before the procedure starts:    An intravenous (IV) line is put into a vein in your arm or hand. This line delivers fluids and medicines.    To keep you free of pain, you will be given anesthesia. This may be sedation, which makes you relaxed and drowsy. Local anesthesia may also be injected or sprayed into your throat to numb it. If you are in  the hospital, you may be given general anesthesia. This puts you in a state like deep sleep through the procedure.  During the procedure  Here is what to expect during the procedure:    A tube with a light and a camera called a scope is used. The tube may be flexible or rigid. If a flexible scope is used, it is passed through your nostril or your throat.     The scope may be moved through the nostril to the throat and then the trachea, bronchi, and bronchioles. Or it may be moved from the mouth to the throat and then to the airways. The scope may send live images from inside the air passages to a video screen. This lets the healthcare provider examine problems more closely.    If needed, a biopsy is done using small tools put through the scope.    Other tests or treatments may be done with different tools put through the scope.  After the procedure  You will be taken to a room to recover from the anesthesia. You will receive pain medicine. Your throat may feel numb or scratchy. Swallowing may feel strange at first. This will improve within a few hours. When you are released to go home, have an adult family member or friend ready to drive you.  Recovering at home  Once home, follow any instructions you have been given. These include:    Take pain medicine as directed.    Don't eat or drink until swallowing returns to normal. As soon as you can swallow comfortably, drink plenty of water.    Use throat lozenges as advised by your healthcare provider to help ease throat soreness.    Rest your voice as instructed by your healthcare provider.  When to call your healthcare provider  After you get home, call the healthcare provider if you have any of the following:    Chest pain or trouble breathing (call 911 or other emergency service)    Fever of 100.4 F (38 C) or higher, or as directed by your healthcare provider    Trouble swallowing doesn t improve or gets worse    Pain that does not go away even after taking pain  "medicine    Severely hoarse voice    Severe nausea or vomiting    Bloody vomit    Cough that brings up more than tiny specks of blood   Follow-up  Follow up with your healthcare provider, or as advised. Within a few weeks, you will receive test results. Your healthcare provider will discuss these with you on the phone or during a follow-up visit. Depending on what was found, you may need further evaluation and treatment.  Risks and possible complications  Risks of this procedure include:    Bleeding    Infection    Swelling  or injury of the throat    Nosebleed (if the scope is passed through the nostril)    Gagging    Vomiting    Cuts in the mouth, nose, or throat    Injury to the teeth    Vocal cord injury    Breathing problems    Pneumothorax (collapsed lung)    Risks of anesthesia    Date Last Reviewed: 6/1/2017 2000-2017 [employer]. [address] [place] [address]. All rights reserved. This information is not intended as a substitute for professional medical care. Always follow your healthcare professional's instructions.                        Pending Results     Date and Time Order Name Status Description    4/13/2018 0835 Surgical pathology exam In process     4/13/2018 0619 EKG 12-lead, tracing only Preliminary             Admission Information     Date & Time Provider Department Dept. Phone    4/13/2018 Ricardo Tirado MD Post Anesthesia Care Unit [facility] [phone]      Your Vitals Were     Blood Pressure Pulse Temperature Respirations Height Weight    123/84 64 98.2  F (36.8  C) (Oral) 16 1.86 m (6' 1.23\") 125 kg (275 lb 9.2 oz)    Pulse Oximetry BMI (Body Mass Index)                93% 36.13 kg/m2          [employer] Information     [employer] lets you send messages to your doctor, view your test results, renew your prescriptions, schedule appointments and more. To sign up, go to [email or web address] . Click on \"Log in\" on the left side of the screen, which " "will take you to the Welcome page. Then click on \"Sign up Now\" on the right side of the page.     You will be asked to enter the access code listed below, as well as some personal information. Please follow the directions to create your username and password.     Your access code is: [email or web address]  Expires: 2018  7:30 AM     Your access code will  in 90 days. If you need help or a new code, please call your [facility] clinic or [phone].        Care EveryWhere ID     This is your Care EveryWhere ID. This could be used by other organizations to access your [facility] medical records  [id number]        Equal Access to Services     AGATA MONTANEZ : Amy Bowling, tong smith, martina le, amor mckeon. [facility] [phone].    ATENCIÓN: Si habla español, tiene a carlson disposición servicios gratuitos de asistencia lingüística. Llame al [phone].    We comply with applicable federal civil rights laws and [place] laws. We do not discriminate on the basis of race, color, national origin, age, disability, sex, sexual orientation, or gender identity.               Review of your medicines      START taking        Dose / Directions    HYDROcodone-acetaminophen 5-325 MG per tablet   Commonly known as:  NORCO   Used for:  Tracheal papillomatosis, Laryngeal papillomatosis        Dose:  1-2 tablet   Take 1-2 tablets by mouth every 4 hours as needed for other (Moderate to Severe Pain)   Quantity:  50 tablet   Refills:  0       lidocaine (viscous) 2 % solution   Commonly known as:  XYLOCAINE   Used for:  Tracheal papillomatosis, Laryngeal papillomatosis        Dose:  15 mL   Take 15 mLs by mouth every 3 hours as needed for moderate pain Swish, gargle, and spit every 3-8 hours as needed; max 8 doses/24 hour period   Quantity:  200 mL   Refills:  0         CONTINUE these medicines which have NOT CHANGED        Dose / Directions    AMLODIPINE BESYLATE PO        " Dose:  5 mg   Take 5 mg by mouth daily   Refills:  0       levothyroxine 112 MCG tablet   Commonly known as:  SYNTHROID/LEVOTHROID        Dose:  224 mcg   Take 224 mcg by mouth daily.   Refills:  0       LEXAPRO PO        Dose:  10 mg   Take 10 mg by mouth daily   Refills:  0       lisinopril-hydrochlorothiazide 20-25 MG per tablet   Commonly known as:  PRINZIDE/ZESTORETIC        Dose:  1 tablet   Take 1 tablet by mouth daily.   Refills:  0       METOPROLOL SUCCINATE ER PO        Dose:  150 mg   Take 150 mg by mouth daily   Refills:  0       order for DME   Used for:  Laryngeal papillomatosis        Equipment being ordered: Suction machine and suction catheters for trach. Please dispense 1 suction machine and 30 catheters with 3 refills   Quantity:  30 Device   Refills:  3       * order for DME   Used for:  Wheezing        Equipment being ordered: Nebulizer   Quantity:  1 Device   Refills:  0       * order for DME   Used for:  Tracheal papillomatosis        Equipment being ordered:   Trach supplies:  Tracheostomy tube 6 mm XLT cuffless (dispense 1) with disposable inner cannulas (dispense 40) Ref number [id number] Thermovent Heat and moisture exchanger, ref [id number] (dispense 40) 14 Italian suction catheters for trach suctioning (dispense 1 Case (50) )   Treatment Diagnosis: Papilloma tracheal papillomatosis   Quantity:  1 Month   Refills:  11       * Notice:  This list has 2 medication(s) that are the same as other medications prescribed for you. Read the directions carefully, and ask your doctor or other care provider to review them with you.         Where to get your medicines      Some of these will need a paper prescription and others can be bought over the counter. Ask your nurse if you have questions.     Bring a paper prescription for each of these medications     HYDROcodone-acetaminophen 5-325 MG per tablet    lidocaine (viscous) 2 % solution                Protect others around you: Learn how to safely use,  store and throw away your medicines at www.disposemymeds.org.        Information about OPIOIDS     PRESCRIPTION OPIOIDS: WHAT YOU NEED TO KNOW    Prescription opioids can be used to help relieve moderate to severe pain and are often prescribed following a surgery or injury, or for certain health conditions. These medications can be an important part of treatment but also come with serious risks. It is important to work with your health care provider to make sure you are getting the safest, most effective care.    WHAT ARE THE RISKS AND SIDE EFFECTS OF OPIOID USE?  Prescription opioids carry serious risks of addiction and overdose, especially with prolonged use. An opioid overdose, often marked by slowed breathing can cause sudden death. The use of prescription opioids can have a number of side effects as well, even when taken as directed:      Tolerance - meaning you might need to take more of a medication for the same pain relief    Physical dependence - meaning you have symptoms of withdrawal when a medication is stopped    Increased sensitivity to pain    Constipation    Nausea, vomiting, and dry mouth    Sleepiness and dizziness    Confusion    Depression    Low levels of testosterone that can result in lower sex drive, energy, and strength    Itching and sweating    RISKS ARE GREATER WITH:    History of drug misuse, substance use disorder, or overdose    Mental health conditions (such as depression or anxiety)    Sleep apnea    Older age (65 years or older)    Pregnancy    Avoid alcohol while taking prescription opioids.   Also, unless specifically advised by your health care provider, medications to avoid include:    Benzodiazepines (such as Xanax or Valium)    Muscle relaxants (such as Soma or Flexeril)    Hypnotics (such as Ambien or Lunesta)    Other prescription opioids    KNOW YOUR OPTIONS:  Talk to your health care provider about ways to manage your pain that do not involve prescription opioids. Some of  these options may actually work better and have fewer risks and side effects:    Pain relievers such as acetaminophen, ibuprofen, and naproxen    Some medications that are also used for depression or seizures    Physical therapy and exercise    Cognitive behavioral therapy, a psychological, goal-directed approach, in which patients learn how to modify physical, behavioral, and emotional triggers of pain and stress    IF YOU ARE PRESCRIBED OPIOIDS FOR PAIN:    Never take opioids in greater amounts or more often than prescribed    Follow up with your primary health care provider and work together to create a plan on how to manage your pain.    Talk about ways to help manage your pain that do not involve prescription opioids    Talk about all concerns and side effects    Help prevent misuse and abuse    Never sell or share prescription opioids    Never use another person's prescription opioids    Store prescription opioids in a secure place and out of reach of others (this may include visitors, children, friends, and family)    Visit www.cdc.gov/drugoverdose to learn about risks of opioid abuse and overdose    If you believe you may be struggling with addiction, tell your health care provider and ask for guidance or call [facility]'s National Helpline at 1-298-619-HELP    LEARN MORE / www.cdc.gov/drugoverdose/prescribing/guideline.html    Safely dispose of unused prescription opioids: Find your local drug take-back programs and more information about the importance of safe disposal at www.doseofreality.mn.gov             Medication List: This is a list of all your medications and when to take them. Check marks below indicate your daily home schedule. Keep this list as a reference.      Medications           Morning Afternoon Evening Bedtime As Needed    AMLODIPINE BESYLATE PO   Take 5 mg by mouth daily                                HYDROcodone-acetaminophen 5-325 MG per tablet   Commonly known as:  NORCO   Take 1-2  tablets by mouth every 4 hours as needed for other (Moderate to Severe Pain)                                levothyroxine 112 MCG tablet   Commonly known as:  SYNTHROID/LEVOTHROID   Take 224 mcg by mouth daily.                                LEXAPRO PO   Take 10 mg by mouth daily                                lidocaine (viscous) 2 % solution   Commonly known as:  XYLOCAINE   Take 15 mLs by mouth every 3 hours as needed for moderate pain Swish, gargle, and spit every 3-8 hours as needed; max 8 doses/24 hour period                                lisinopril-hydrochlorothiazide 20-25 MG per tablet   Commonly known as:  PRINZIDE/ZESTORETIC   Take 1 tablet by mouth daily.                                METOPROLOL SUCCINATE ER PO   Take 150 mg by mouth daily                                order for DME   Equipment being ordered: Suction machine and suction catheters for trach. Please dispense 1 suction machine and 30 catheters with 3 refills                                * order for DME   Equipment being ordered: Nebulizer                                * order for DME   Equipment being ordered:   Trach supplies:  Tracheostomy tube 6 mm XLT cuffless (dispense 1) with disposable inner cannulas (dispense 40) Ref number [id number] Thermovent Heat and moisture exchanger, ref [id number] (dispense 40) 14 French suction catheters for trach suctioning (dispense 1 Case (50) )   Treatment Diagnosis: Papilloma tracheal papillomatosis                                * Notice:  This list has 2 medication(s) that are the same as other medications prescribed for you. Read the directions carefully, and ask your doctor or other care provider to review them with you.

## 2018-04-13 NOTE — BRIEF OP NOTE
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   Post-operative diagnosis same  Procedure: Procedure(s):  Direct Laryngoscopy With Microscope, Tracheoscopy, KTP Laser Excision Of Tracheal Papillomas and pharyngeal papillomas and biopsies - Wound Class: II-Clean Contaminated   - Wound Class: II-Clean Contaminated   - Wound Class: II-Clean Contaminated  Surgeon: Surgeon(s) and Role:  Panel 1:     * Ricardo Tirado MD - Primary    Panel 2:     Resident: Dick Parra MD  Anesthesia: General   Estimated blood loss: Less than 10 ml  Drains: None  Specimens:   ID Type Source Tests Collected by Time Destination   A : pharyngeal papilloma  Tissue Throat SURGICAL PATHOLOGY EXAM Ricardo Tirado MD 4/13/2018  8:33 AM    B : Base of Tongue Tissue Tongue SURGICAL PATHOLOGY EXAM Ricardo Tirado MD 4/13/2018  9:21 AM      Findings:    Pharyngeal and tracheal papilloma. Large base of tongue papilloma. 30 W per pulse with pulse width of 15 milliseconds and a 2-Hz repetition rate. Total Joules used were 619.  Complications: None.  Implants: None.

## 2018-04-13 NOTE — ANESTHESIA CARE TRANSFER NOTE
Patient: Alex Gayle    Procedure(s):  Direct Laryngoscopy With Microscope, Tracheoscopy, KTP Laser Excision Of Tracheal Papillomas and pharyngeal papillomas and biopsies - Wound Class: II-Clean Contaminated   - Wound Class: II-Clean Contaminated   - Wound Class: II-Clean Contaminated    Diagnosis: Laryngeal And Tracheal Papillomas   Diagnosis Additional Information: No value filed.    Anesthesia Type:   General, ETT, Trach     Note:  Airway :Tracheostomy  Patient transferred to:PACU  Handoff Report: Identifed the Patient, Identified the Reponsible Provider, Reviewed the pertinent medical history, Discussed the surgical course, Reviewed Intra-OP anesthesia mangement and issues during anesthesia, Set expectations for post-procedure period and Allowed opportunity for questions and acknowledgement of understanding      Vitals: (Last set prior to Anesthesia Care Transfer)    CRNA VITALS  4/13/2018 0926 - 4/13/2018 1004      4/13/2018             Pulse: 72    SpO2: 96 %    Resp Rate (set): 10                Electronically Signed By: LEOLA Tobar CRNA  April 13, 2018  10:04 AM

## 2018-04-13 NOTE — ANESTHESIA POSTPROCEDURE EVALUATION
Patient: Alex Gayle    Procedure(s):  Direct Laryngoscopy With Microscope, Tracheoscopy, KTP Laser Excision Of Tracheal Papillomas and pharyngeal papillomas and biopsies - Wound Class: II-Clean Contaminated   - Wound Class: II-Clean Contaminated   - Wound Class: II-Clean Contaminated    Diagnosis:Laryngeal And Tracheal Papillomas   Diagnosis Additional Information: No value filed.    Anesthesia Type:  General, ETT, Trach    Note:  Anesthesia Post Evaluation    Patient location during evaluation: PACU  Patient participation: Able to fully participate in evaluation  Level of consciousness: awake and alert  Pain management: adequate  Airway patency: patent  Cardiovascular status: acceptable  Respiratory status: acceptable (tracheostomy)  Hydration status: acceptable  PONV: none     Anesthetic complications: None          Last vitals:  Vitals:    04/13/18 1000 04/13/18 1002 04/13/18 1015   BP: 131/86  116/83   Pulse: 64     Resp: 20 16 20   Temp: 36.7  C (98.1  F)     SpO2: 95% 96% 97%         Electronically Signed By: Carito Eaton MD  April 13, 2018  10:29 AM

## 2018-04-13 NOTE — ANESTHESIA PREPROCEDURE EVALUATION
Anesthesia Evaluation     . Pt has had prior anesthetic. Type: General    History of anesthetic complications (prefers opiate free anesthetic due to PONV)   - PONV        ROS/MED HX    ENT/Pulmonary:     (+)other ENT- Tracheostomy. Laryngeal/ Tracheal Papillomas, , . .    Neurologic:  - neg neurologic ROS     Cardiovascular:     (+) Dyslipidemia, hypertension----. : . . . :. .       METS/Exercise Tolerance: Comment: Exercise tolerance lmtd by SOB related to papillomas. 3 - Able to walk 1-2 blocks without stopping   Hematologic:  - neg hematologic  ROS       Musculoskeletal: Comment: Osteoarthritis  (+) arthritis, , , -       GI/Hepatic:  - neg GI/hepatic ROS       Renal/Genitourinary:  - ROS Renal section negative       Endo:     (+) thyroid problem hypothyroidism, .      Psychiatric:     (+) psychiatric history anxiety and depression      Infectious Disease:  - neg infectious disease ROS       Malignancy:      - no malignancy   Other:    (+) H/O Chronic Pain,  - neg other ROS                 Physical Exam      Airway   Mallampati: III  TM distance: >3 FB  Neck ROM: full  Comment: Tracheostomy    Dental     Cardiovascular   Rhythm and rate: regular and normal      Pulmonary    breath sounds clear to auscultation                        Anesthesia Plan      History & Physical Review      ASA Status:  3 .    NPO Status:  > 8 hours    Plan for General, ETT and Trach with Intravenous and Propofol induction. Maintenance will be TIVA.    PONV prophylaxis:  Ondansetron (or other 5HT-3) and Dexamethasone or Solumedrol       Postoperative Care  Postoperative pain management:  IV analgesics.      Consents  Anesthetic plan, risks, benefits and alternatives discussed with:  Patient.  Use of blood products discussed: No .   .        62M with HTN, HLD, hypothyroid and laryngeal polyps here for laser excision.  ASA 3.  Tracheostomy in place.  Plan: GA via existing tracheostomy.  Risks of anesthesia discussed, including sore  throat, PONV and risk of dental or lip trauma.    Carito Eaton MD  Staff Anesthesiologist  Pager [phone]

## 2018-04-15 NOTE — OP NOTE
Procedure Date: 04/13/2018      DATE OF SURGERY: 04/13/2018       PREOPERATIVE DIAGNOSIS:  Oropharyngeal and tracheal papillomas.      POSTOPERATIVE DIAGNOSIS:  Oropharyngeal and tracheal papillomas.      PROCEDURE:  Direct laryngoscopy with microscope tracheoscopy, KTP laser excision of tracheal papillomas.   Shaver and KTP laser excisions of laryngeal papillomas.  Biopsy of pharyngeal papillomas.      SURGEON:  Ricardo Tirado MD       PRIMARY RESIDENT SURGEON:  Dick Parra MD      INDICATIONS:  The patient is a 62-year-old gentleman with a history of laryngeal papilloma.  He was originally diagnosed as a cancer and had a supraglottic laryngectomy approximately 30 years ago at another institution.  He had a tracheotomy at the time of the supraglottic  laryngectomy.  This remained in place due to supraglottic scarring.  He was treated for recurrent respiratory papillomas by Dr. Ricardo Mars for several years.  He was then seen by Dr. Salazar [facility] who had treated him recently.  He has had emergent removal of papillomas by Dr. Fuentes of the Thoracic Surgery service.  I had the opportunity to meet him on 11/28/2017.  At that time he had pharyngeal papillomas and tracheal papillomas seen through the tracheotomy tube.  Several attempts to bring him to the operating room but were finally fruitful and he now comes for today's treatment as outlined above.      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 A very large amount of pharyngeal papillomas obstructing the pharynx.  Adequate excision was able to be obtained with shaver and KTP laser.      DESCRIPTION OF PROCEDURE:  After obtaining informed consent, the patient was brought to the operating room.  He was laid in supine position, prepped in sterile fashion and given general endotracheal anesthesia.  A laser safe tracheotomy tube with a ventilating  adapter was then placed in his trachea and ventilation was maintained.  Using a 3.8 fiberoptic bronchoscope with a working channel, the distal trachea was examined.  There were papillomatous lesions at the distal end of the tracheotomy tube.  A 0.4 mm fiber was passed through the working channel of the bronchoscope and with laser settings of 30 way, 15 millisecond pulse duration and 2 pulses per second laser treatment was applied to the papillomas within the trachea with the KTP laser.  Once all the papillomas were adequately treated, the bronchoscope was removed.  The laser safe tracheotomy tube was removed and a laser safe endotracheal tube was placed through the tracheotomy stoma.  Tooth guards were placed on the upper and lower dental arches.  A direct microlaryngoscopy was then performed with an operating microscope and a Dedo laryngoscope.   A minimal amount of papillomas were seen on the left vocal fold below a stenotic area in the supraglottis.  These were removed with the KTP laser with the same settings used for the tracheal papillomas. .  Attention was then turned to the pharynx where a very large amount of papillomatous tissue was seen.  These were thought to be too great for a KTP laser and therefore a laryngeal Tricut shaver was brought into the field. The operating microscope under a lower magnification was used for the majority of the pharyngeal portion of the surgery.  A biopsy of the pharyngeal papillomas was sent for HPV typing.  Then, using the tracheal Tricut shaver, a large amount of papillomas were removed.  Near the base of the tongue, some of the papillomas appeared more firm and a second biopsy was obtained to rule out any malignant transformation.  Once the majority of the papillomas had been excised, the KTP laser was then brought again into the field and using a hand held/laryngeal cast for the KTP laser, KTP laser energy was applied to the tongue base and lateral pharyngeal wall,  primarily on the left side.  Laser settings were again 30 way, 15 millisecond pulse duration and 2 pulses per second.  A large amount of laser energy was applied to the base of  tongue.  This allowed for hemostasis and further treatment of the residual papillomatous lesions.  The area was thoroughly cleaned.  There was much greater room within the pharynx at the end of this portion of the procedure.        Finally, the remainder of the oropharynx was examined.  A small amount of papilloma was seen on the pharyngeal surface of the uvula and final treatment with KTP laser energy was performed.  The total joules used for the entire procedure of the KTP laser were 619.  Following this, the pharynx was carefully inspected.  The oropharynx was suctioned clear.  An orogastric tube was placed to suction the esophagus and stomach.  The laryngoscope was removed.  The tooth guards were removed.  A new size #6 extra-long distal Shiley tracheotomy tube was placed.  He ventilated well and was turned over to Anesthesia where he was recovered.  He left the operating room in stable condition.         ROBBIE PIMENTEL MD             D: 2018   T: 2018   MT: MD      Name:     EVENS FLEMING   MRN:      [id number]        Account:        [id number]   :      1955           Procedure Date: 2018      Document: [id number]

## 2018-04-16 ENCOUNTER — TELEPHONE (OUTPATIENT)
Dept: OTOLARYNGOLOGY | Facility: CLINIC | Age: 63
End: 2018-04-16

## 2018-04-16 LAB — COPATH REPORT: NORMAL

## 2018-04-24 ENCOUNTER — TELEPHONE (OUTPATIENT)
Dept: OTOLARYNGOLOGY | Facility: CLINIC | Age: 63
End: 2018-04-24

## 2018-04-24 NOTE — TELEPHONE ENCOUNTER
Patient called and told results of pathology report.  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  Will make arrangements for him to see Dr. Salazar.

## 2018-11-19 ENCOUNTER — TELEPHONE (OUTPATIENT)
Dept: OTOLARYNGOLOGY | Facility: CLINIC | Age: 63
End: 2018-11-19

## 2018-11-19 NOTE — TELEPHONE ENCOUNTER
[facility] Call Center    Phone Message    May a detailed message be left on voicemail: yes    Reason for Call: Other: PT:  Pt calling wanting to see Dr. Tirado can squeeze him in, pt is having blockage in his throat. Pt is open for any time of the day. Writer philomena pt for first available on 1/10/19 for now, pt is on a waiting list.     Action Taken: Message routed to:  [facility] ([facility] ENT

## 2018-11-27 ENCOUNTER — PATIENT OUTREACH (OUTPATIENT)
Dept: CARE COORDINATION | Facility: CLINIC | Age: 63
End: 2018-11-27

## 2018-12-04 ENCOUNTER — OFFICE VISIT (OUTPATIENT)
Dept: OTOLARYNGOLOGY | Facility: CLINIC | Age: 63
End: 2018-12-04
Payer: COMMERCIAL

## 2018-12-04 VITALS
DIASTOLIC BLOOD PRESSURE: 86 MMHG | SYSTOLIC BLOOD PRESSURE: 130 MMHG | HEIGHT: 73 IN | BODY MASS INDEX: 36.05 KG/M2 | WEIGHT: 272 LBS

## 2018-12-04 DIAGNOSIS — D14.1 LARYNGEAL PAPILLOMATOSIS: Primary | ICD-10-CM

## 2018-12-04 DIAGNOSIS — D14.2 TRACHEAL PAPILLOMATOSIS: ICD-10-CM

## 2018-12-04 RX ORDER — NYSTATIN 100000/ML
SUSPENSION, ORAL (FINAL DOSE FORM) ORAL
Refills: 0 | COMMUNITY
Start: 2018-10-31

## 2018-12-04 RX ORDER — DOXYCYCLINE HYCLATE 100 MG
TABLET ORAL
COMMUNITY
Start: 2018-10-31

## 2018-12-04 RX ORDER — LEVOTHYROXINE SODIUM 200 UG/1
TABLET ORAL
Refills: 0 | COMMUNITY
Start: 2018-10-29

## 2018-12-04 NOTE — MR AVS SNAPSHOT
After Visit Summary   12/4/2018    Alex Gayle    MRN: [id number]           Patient Information     Date Of Birth          1955        Visit Information        Provider Department      12/4/2018 8:45 AM Ricardo Tirado MD [facility]        Today's Diagnoses     Laryngeal papillomatosis    -  1    Tracheal papillomatosis          Care Instructions    1.  You were seen in the ENT Clinic today by Dr. Tirado.  If you have any questions or concerns after your appointment, please call [phone].  Press option #1 for scheduling related needs.  Press option #3 for Nurse advice.  2.  Plan is to return to clinic to see Dr Tirado as needed.  3. Please follow with Dr. Salazar at [facility].    Aimee Deng RN  [facility] ENT   Head & Neck Surgery               Follow-ups after your visit        Follow-up notes from your care team     Return if symptoms worsen or fail to improve, for to follow up with Dr. Salazar at [facility].      Your next 10 appointments already scheduled     Waqar 10, 2019  3:45 PM CST   (Arrive by 3:30 PM)   Return Visit with Riacrdo Tirado MD   [facility] ([facility])    [address] [place] [id number] [phone]           This is a multi-disciplinary care team visit as patients with your type of problem are usually seen by a team of an MD and a Speech-Language Pathologist (who is a specialist in disorders of the voice, throat, and breathing).  Please plan about 2 hours for your visit, which will likely include Laryngeal Function Studies, a Voice/Swallow/Breathing Evaluation, and an Endoscopic Laryngeal Examination to provide a comprehensive evaluation.  Please check with your insurance company to ensure you are covered for these services. - It is important to know that if you are evaluated and/or treated by both a physician and a speech pathologist  during your visit, your billing will reflect the input that you receive from both providers as separate professionals. Although most insurance plans do cover these services, we encourage you to contact your insurance company prior to your visit to determine whether there are any coverage limitations that might affect you financially. - Billing/procedure codes that are frequently associated with visits to our clinic include (but are not limited to) the ones listed below. Most patients will not need all of these items, but it may be useful to ask your insurance company's patient . 46326: Flexible fiberoptic laryngoscopy, 12258: Laryngoscopy; flexible or rigid fiberoptic, with stroboscopy, 37306: Flexible endoscopic evaluation of swallowing, 32232: Laryngeal function aerodynamic evaluation, 22506: Evaluation of Voice and Resonance, 05822: Speech pathology treatment for voice, speech, communication, 12651: Speech pathology treatment for swallowing/oral function for feeding - If you have any concerns or questions, or if you would prefer not to have a speech pathologist involved in your visit, please contact our Clinic Coordinator at [phone], at least 24 hours prior to your appointment.              Who to contact     Please call your clinic at [phone] to:    Ask questions about your health    Make or cancel appointments    Discuss your medicines    Learn about your test results    Speak to your doctor            Additional Information About Your Visit        MyChart Information     Blink for iPhone and Androidt is an electronic gateway that provides easy, online access to your medical records. With [employer], you can request a clinic appointment, read your test results, renew a prescription or communicate with your care team.     To sign up for Blink for iPhone and Androidt visit the website at [email or web address]   You will be asked to enter the access code listed below, as well as some personal information. Please follow  "the directions to create your username and password.     Your access code is: [email or web address]  Expires: 2019  6:30 AM     Your access code will  in 90 days. If you need help or a new code, please contact your [facility] or call [phone] for assistance.        Care EveryWhere ID     This is your Care EveryWhere ID. This could be used by other organizations to access your [facility] medical records  [id number]        Your Vitals Were     Height BMI (Body Mass Index)                1.854 m (6' 1\") 35.89 kg/m2           Blood Pressure from Last 3 Encounters:   18 130/86   18 123/82   17 124/82    Weight from Last 3 Encounters:   18 123.4 kg (272 lb)   18 125 kg (275 lb 9.2 oz)   17 122.5 kg (270 lb)              We Performed the Following     IMAGESTREAM RECORDING ORDER     LARYNGOSCOPY FLEX FIBEROPTIC, DIAGNOSTIC        Primary Care Provider Office Phone # Fax #    Gino MEDINA Kashmirmattie [phone]       [place] [address]        Equal Access to Services     SEBASTIEN MONTANEZ : Hadii aad ku hadasho Soomaali, waaxda luqadaha, qaybta kaalmada adeegyada, waxay markin haydennisn xiao garcia . So [facility] [phone].    ATENCIÓN: Si habla español, tiene a carlson disposición servicios gratuitos de asistencia lingüística. Llame al [phone].    We comply with applicable federal civil rights laws and [place] laws. We do not discriminate on the basis of race, color, national origin, age, disability, sex, sexual orientation, or gender identity.            Thank you!     Thank you for choosing [facility]  for your care. Our goal is always to provide you with excellent care. Hearing back from our patients is one way we can continue to improve our services. Please take a few minutes to complete the written survey that you may receive in the mail after your visit with us. Thank you!           "   Your Updated Medication List - Protect others around you: Learn how to safely use, store and throw away your medicines at www.disposemymeds.org.          This list is accurate as of 12/4/18 11:59 PM.  Always use your most recent med list.                   Brand Name Dispense Instructions for use Diagnosis    AMLODIPINE BESYLATE PO      Take 5 mg by mouth daily        clotrimazole 10 MG Lozg lozenge    MYCELEX          doxycycline hyclate 100 MG tablet    VIBRA-TABS     TAKE ONE TABLET BY MOUTH TWICE A DAY        HYDROcodone-acetaminophen 5-325 MG tablet    NORCO    50 tablet    Take 1-2 tablets by mouth every 4 hours as needed for other (Moderate to Severe Pain)    Tracheal papillomatosis, Laryngeal papillomatosis       * levothyroxine 112 MCG tablet    SYNTHROID/LEVOTHROID     Take 224 mcg by mouth daily.        * levothyroxine 200 MCG tablet    SYNTHROID/LEVOTHROID          LEXAPRO PO      Take 10 mg by mouth daily        lidocaine VISCOUS 2 % solution    XYLOCAINE    200 mL    Take 15 mLs by mouth every 3 hours as needed for moderate pain Swish, gargle, and spit every 3-8 hours as needed; max 8 doses/24 hour period    Tracheal papillomatosis, Laryngeal papillomatosis       lisinopril-hydrochlorothiazide 20-25 MG tablet    PRINZIDE/ZESTORETIC     Take 1 tablet by mouth daily.        METOPROLOL SUCCINATE ER PO      Take 150 mg by mouth daily        nystatin [id number] UNIT/ML suspension    MYCOSTATIN          order for DME     30 Device    Equipment being ordered: Suction machine and suction catheters for trach. Please dispense 1 suction machine and 30 catheters with 3 refills    Laryngeal papillomatosis       * order for DME     1 Device    Equipment being ordered: Nebulizer    Wheezing       * order for DME     1 Month    Equipment being ordered:   Trach supplies:  Tracheostomy tube 6 mm XLT cuffless (dispense 1) with disposable inner cannulas (dispense 40) Ref number [id number] Thermovent Heat and moisture  exchanger, ref [id number] (dispense 40) 14 Gabonese suction catheters for trach suctioning (dispense 1 Case (50) )   Treatment Diagnosis: Papilloma tracheal papillomatosis    Tracheal papillomatosis       * Notice:  This list has 4 medication(s) that are the same as other medications prescribed for you. Read the directions carefully, and ask your doctor or other care provider to review them with you.

## 2018-12-04 NOTE — PATIENT INSTRUCTIONS
1.  You were seen in the ENT Clinic today by Dr. Tirado.  If you have any questions or concerns after your appointment, please call [phone].  Press option #1 for scheduling related needs.  Press option #3 for Nurse advice.  2.  Plan is to return to clinic to see Dr Tirado as needed.  3. Please follow with Dr. Salazar at [facility].    Aimee Deng RN  [facility] ENT   Head & Neck Surgery

## 2018-12-04 NOTE — LETTER
12/4/2018       RE: Alex Gayle  [address] [place] [id number]     Dear Colleague,    Thank you for referring your patient, Alex Gayle, to the [facility] EAR NOSE AND THROAT at [facility]. Please see a copy of my visit note below.      HISTORY OF PRESENT ILLNESS: Alex Gayle is a 63 year old male with a history of of laryngeal papilloma. He was originally diagnosed as a cancer and had a supraglottic laryngectomy approximately 30 years ago. He had a tracheotomy at the time of the supraglottic laryngectomy. This has remained in place due to supraglottic scarring. 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 [facility] 2012.  Follow-up with Dr. Salazar in November 2015. 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 He was seen by Dr. 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 He also consulted with Dr. Salazar regarding resection and this was felt not to be his best option. He had trouble with his Ruben tracheotomy and he was changed to a Shiley XLT distal internal diameter 6.0 mm, outer diameter 11.0 mm and Length 95 mm tracheotomy. He has done well with this trach and able to change it quite easily.      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  We went to the operating room for direct laryngoscopy and KTP laser excision of the base of tongue papillomas as well as a fiberoptic laryngoscopy through his tracheostomy site for excision of his tracheal papillomas.  This was performed on 4/13/2018.  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  He has subsequently undergone chemo radiation.  He did require  "hospitalization at the end or at the beginning of August of this year.  He completed his chemo radiation on 9/25/2018.  He comes today for some difficulty breathing.  He has no difficulties with his Shiley 6 distal extended tracheotomy tube.  He is maintaining an oral diet although there he has some as expected dry mouth present.  Of note he has not followed up with Dr. Fuentes since after the surgery.  Except for a visit in July 2018.        PAST MEDICAL HISTORY:   Past Medical History:   Diagnosis Date     Hypertension      Laryngeal polyp      Papillomatosis of trachea      PONV (postoperative nausea and vomiting)     \"better now using less narcotics     Pulmonary nodules      Thyroid disease     hypothyroidism     Tracheostomy in place (H)     #6 Ruben       PAST SURGICAL HISTORY:   Past Surgical History:   Procedure Laterality Date     BRONCHOSCOPY FLEXIBLE N/A 4/13/2018    Procedure: BRONCHOSCOPY FLEXIBLE;;  Surgeon: Ricardo Tirado MD;  [facility]     BRONCHOSCOPY FLEXIBLE,L CRYOBIOPSY N/A 1/7/2016    Procedure: BRONCHOSCOPY FLEXIBLE, CRYOBIOPSY;  Surgeon: Derik Fuentes MD;  [facility]     BRONCHOSCOPY FLEXIBLE,L CRYOBIOPSY N/A 1/28/2016    Procedure: BRONCHOSCOPY FLEXIBLE, CRYOBIOPSY;  Surgeon: Derik Fuentes MD;  [facility]     BRONCHOSCOPY FLEXIBLE,L CRYOBIOPSY N/A 3/14/2016    Procedure: BRONCHOSCOPY FLEXIBLE, CRYOBIOPSY;  Surgeon: Derik Fuentes MD;  [facility]     LARYNGECTOMY  1982     LARYNGOSCOPY WITH MICROSCOPE N/A 4/13/2018    Procedure: LARYNGOSCOPY WITH MICROSCOPE;  Direct Laryngoscopy With Microscope, Tracheoscopy, KTP Laser Excision Of Tracheal Papillomas and pharyngeal papillomas and biopsies;  Surgeon: Ricardo Tirado MD;  [facility]     LASER CO2 LARYNGOSCOPY  6/6/2011    Procedure:LASER CO2 LARYNGOSCOPY; Micro Direct Laryngoscopy with Vocal Cord Stripping WITH CO2 LASER STANDBY.; Surgeon:BRADLEY FUENTES; " Location[facility]     LASER KTP BRONCHOSCOPY  7/25/2014    Procedure: LASER KTP BRONCHOSCOPY;  Surgeon: Mehul Fuentes MD;  [facility]     LASER KTP BRONCHOSCOPY, DIRECT LARYNGOSCOPY, COMBINED  4/11/2012    Procedure:COMBINED LASER KTP BRONCHOSCOPY, DIRECT LARYNGOSCOPY;  Direct Laryngoscopy, Bronchoscopy  with KTP Laser and Excision of Tracheal Papilloma *[employer] Safe Room*; Surgeon:MEHUL FUENTES; [facility]     LASER KTP BRONCHOSCOPY, DIRECT LARYNGOSCOPY, COMBINED N/A 4/8/2015    Procedure: COMBINED LASER KTP BRONCHOSCOPY, DIRECT LARYNGOSCOPY;  Surgeon: Mehul Fuentes MD;  [facility]     LASER KTP LARYNGOSCOPY N/A 4/13/2018    Procedure: LASER KTP LARYNGOSCOPY;;  Surgeon: Ricardo Tirado MD;  [facility]     ORTHOPEDIC SURGERY      left knee surgery     ORTHOPEDIC SURGERY      back surgery     ORTHOPEDIC SURGERY      bilateral shoulder surgery       FAMILY HISTORY: No family history on file.    SOCIAL HISTORY:   Social History   Substance Use Topics     Smoking status: Never Smoker     Smokeless tobacco: Never Used     Alcohol use No       REVIEW OF SYSTEMS: Ten point review of systems was performed and is negative except for:   [facility] ROS 11/28/2017   Ears, Nose, Throat Trouble swallowing, Hoarseness   Cardiopulmonary Cough        ALLERGIES: Lisinopril    MEDICATIONS:   Current Outpatient Prescriptions   Medication Sig Dispense Refill     AMLODIPINE BESYLATE PO Take 5 mg by mouth daily       clotrimazole (MYCELEX) 10 MG LOZG lozenge   0     Escitalopram Oxalate (LEXAPRO PO) Take 10 mg by mouth daily       levothyroxine (SYNTHROID, LEVOTHROID) 112 MCG tablet Take 224 mcg by mouth daily.       levothyroxine (SYNTHROID/LEVOTHROID) 200 MCG tablet   0     lidocaine, viscous, (XYLOCAINE) 2 % solution Take 15 mLs by mouth every 3 hours as needed for moderate pain Swish, gargle, and spit every 3-8 hours as needed; max 8 doses/24 hour period 200 mL 0      lisinopril-hydrochlorothiazide (PRINZIDE,ZESTORETIC) 20-25 MG per tablet Take 1 tablet by mouth daily.       METOPROLOL SUCCINATE ER PO Take 150 mg by mouth daily        nystatin (MYCOSTATIN) [id number] UNIT/ML suspension   0     order for DME Equipment being ordered:    Trach supplies:   Tracheostomy tube 6 mm XLT cuffless (dispense 1) with disposable inner cannulas (dispense 40) Ref number [id number]  Thermovent Heat and moisture exchanger, ref [id number] (dispense 40)  14 French suction catheters for trach suctioning (dispense 1 Case (50) )      Treatment Diagnosis: Papilloma tracheal papillomatosis 1 Month 11     order for DME Equipment being ordered: Nebulizer 1 Device 0     order for DME Equipment being ordered: Suction machine and suction catheters for trach. Please dispense 1 suction machine and 30 catheters with 3 refills 30 Device 3     doxycycline hyclate (VIBRA-TABS) 100 MG tablet TAKE ONE TABLET BY MOUTH TWICE A DAY       HYDROcodone-acetaminophen (NORCO) 5-325 MG per tablet Take 1-2 tablets by mouth every 4 hours as needed for other (Moderate to Severe Pain) (Patient not taking: Reported on 12/4/2018) 50 tablet 0         PHYSICAL EXAMINATION:  He  is awake, alert and in no apparent distress.    His tympanic membranes are clear and intact bilaterally. External auditory canals are clear.  Nasal exam shows a mild septal deviation without obstruction.  Examination of the oral cavity shows no suspicious lesions.  There is symmetric movement of the tongue and soft palate.    The oropharynx is clear.  His neck is supple without significant adenopathy. There is a Shiley  XLT tracheotomy tube in place. The stoma is clear. There are postsurgical and radiation changes in his neck which are well healed. Pulse is regular.  Upper airway is resticted.  Cranial nerves II-XII are grossly intact.           PROCEDURE: A flexible laryngoscopy  was performed.  Informed consent was obtained and a time out was performed. 3% lidocaine  and 0.25% phenylephrine was sprayed into the nasal cavity and allowed 3 to 5 minutes for effect. The scope was passed through the right sided nostril.  Examination of the base of tongue showed no obvious papillomatous lesions at the base of tongue or hypopharynx.  There are radiation changes of the larynx.  There is minimal mobility of the vocal folds and with edematous changes the airway is restricted.  There are thickened secretions in the hypopharynx but no gross pooling of saliva is seen.     Hypopharynx      Larynx with post radiation edema          The scope was then passed through his tracheostomy stoma both with the trach tube in place.  The trachea distal to the tube is clear of papillomatous lesions.  When the tube was pulled proximally a moderate amount of papillomas was seen on theposterior aspect of the tracheal wall.   There is collapse of the trachea in an A-frame configuration down to the level of the tracheotomy tube. No distal tracheal papillomatous lesions were seen.    Papillomas proximal to distal tip of tracheostomy tube      IMPRESSION/PLAN: Is doing well from a oropharyngeal standpoint in terms of his papillomas and is currently undergoing chemotherapy R has completed chemotherapy for his cancer and I is now in observation.  He is not followed up with a head neck surgical oncologist and I did bring Dr. Larson into the room and he arranged follow-up at [facility] for him in approximately 6 weeks.  His tracheal papillomas are not affecting his airway at this time.  After thorough discussion with Dr. Casillas, myself and Mr. Joshi we decided to have him follow-up with Dr. Casillas at [facility].  Should the tracheal papillomas become bothersome either Dr. Casillas or myself can address these in the operating room.      Again, thank you for allowing me to participate in the care of your patient.      Sincerely,    Ricardo Tirado MD

## 2018-12-04 NOTE — PROGRESS NOTES
HISTORY OF PRESENT ILLNESS: Alex Gayle is a 63 year old male with a history of of laryngeal papilloma. He was originally diagnosed as a cancer and had a supraglottic laryngectomy approximately 30 years ago. He had a tracheotomy at the time of the supraglottic laryngectomy. This has remained in place due to supraglottic scarring. 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 He was then seen by Dr. Salazar at [facility] who took him to the operating room in 2012.  Follow-up with Dr. Salazar in November 2015. 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 He was seen by Dr. 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 He also consulted with Dr. Salazar regarding resection and this was felt not to be his best option. He had trouble with his Ruben tracheotomy and he was changed to a Shiley XLT distal internal diameter 6.0 mm, outer diameter 11.0 mm and Length 95 mm tracheotomy. He has done well with this trach and able to change it quite easily.      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  We went to the operating room for direct laryngoscopy and KTP laser excision of the base of tongue papillomas as well as a fiberoptic laryngoscopy through his tracheostomy site for excision of his tracheal papillomas.  This was performed on 4/13/2018.  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  He has subsequently undergone chemo radiation.  He did require hospitalization at the end or at the beginning of August of this year.  He completed his chemo radiation on 9/25/2018.  He comes today for some difficulty breathing.  He has no difficulties with his Shiley 6 distal extended tracheotomy tube.  He is maintaining an oral diet although there he  "has some as expected dry mouth present.  Of note he has not followed up with Dr. Fuentes since after the surgery.  Except for a visit in July 2018.        PAST MEDICAL HISTORY:   Past Medical History:   Diagnosis Date     Hypertension      Laryngeal polyp      Papillomatosis of trachea      PONV (postoperative nausea and vomiting)     \"better now using less narcotics     Pulmonary nodules      Thyroid disease     hypothyroidism     Tracheostomy in place (H)     #6 Ruben       PAST SURGICAL HISTORY:   Past Surgical History:   Procedure Laterality Date     BRONCHOSCOPY FLEXIBLE N/A 4/13/2018    Procedure: BRONCHOSCOPY FLEXIBLE;;  Surgeon: Ricardo Tirado MD;  [facility]     BRONCHOSCOPY FLEXIBLE,L CRYOBIOPSY N/A 1/7/2016    Procedure: BRONCHOSCOPY FLEXIBLE, CRYOBIOPSY;  Surgeon: Derik Fuentes MD;  [facility]     BRONCHOSCOPY FLEXIBLE,L CRYOBIOPSY N/A 1/28/2016    Procedure: BRONCHOSCOPY FLEXIBLE, CRYOBIOPSY;  Surgeon: Derik Fuentes MD;  [facility]     BRONCHOSCOPY FLEXIBLE,L CRYOBIOPSY N/A 3/14/2016    Procedure: BRONCHOSCOPY FLEXIBLE, CRYOBIOPSY;  Surgeon: Derik Fuentes MD;  [facility]     LARYNGECTOMY  1982     LARYNGOSCOPY WITH MICROSCOPE N/A 4/13/2018    Procedure: LARYNGOSCOPY WITH MICROSCOPE;  Direct Laryngoscopy With Microscope, Tracheoscopy, KTP Laser Excision Of Tracheal Papillomas and pharyngeal papillomas and biopsies;  Surgeon: Ricardo Tirado MD;  Location: [facility] OR     LASER CO2 LARYNGOSCOPY  6/6/2011    Procedure:LASER CO2 LARYNGOSCOPY; Micro Direct Laryngoscopy with Vocal Cord Stripping WITH CO2 LASER STANDBY.; Surgeon:MEHUL FUENTES; [facility]     LASER KTP BRONCHOSCOPY  7/25/2014    Procedure: LASER KTP BRONCHOSCOPY;  Surgeon: Mehul Fuentes MD;  [facility]     LASER KTP BRONCHOSCOPY, DIRECT LARYNGOSCOPY, COMBINED  4/11/2012    Procedure:COMBINED LASER KTP BRONCHOSCOPY, DIRECT LARYNGOSCOPY;  Direct " Laryngoscopy, Bronchoscopy  with KTP Laser and Excision of Tracheal Papilloma *[employer] Safe Room*; Surgeon:MEHUL FUENTES; [facility]     LASER KTP BRONCHOSCOPY, DIRECT LARYNGOSCOPY, COMBINED N/A 4/8/2015    Procedure: COMBINED LASER KTP BRONCHOSCOPY, DIRECT LARYNGOSCOPY;  Surgeon: Mehul Fuentes MD;  [facility]     LASER KTP LARYNGOSCOPY N/A 4/13/2018    Procedure: LASER KTP LARYNGOSCOPY;;  Surgeon: Ricardo Tirado MD;  [facility]     ORTHOPEDIC SURGERY      left knee surgery     ORTHOPEDIC SURGERY      back surgery     ORTHOPEDIC SURGERY      bilateral shoulder surgery       FAMILY HISTORY: No family history on file.    SOCIAL HISTORY:   Social History   Substance Use Topics     Smoking status: Never Smoker     Smokeless tobacco: Never Used     Alcohol use No       REVIEW OF SYSTEMS: Ten point review of systems was performed and is negative except for:   [facility] ROS 11/28/2017   Ears, Nose, Throat Trouble swallowing, Hoarseness   Cardiopulmonary Cough        ALLERGIES: Lisinopril    MEDICATIONS:   Current Outpatient Prescriptions   Medication Sig Dispense Refill     AMLODIPINE BESYLATE PO Take 5 mg by mouth daily       clotrimazole (MYCELEX) 10 MG LOZG lozenge   0     Escitalopram Oxalate (LEXAPRO PO) Take 10 mg by mouth daily       levothyroxine (SYNTHROID, LEVOTHROID) 112 MCG tablet Take 224 mcg by mouth daily.       levothyroxine (SYNTHROID/LEVOTHROID) 200 MCG tablet   0     lidocaine, viscous, (XYLOCAINE) 2 % solution Take 15 mLs by mouth every 3 hours as needed for moderate pain Swish, gargle, and spit every 3-8 hours as needed; max 8 doses/24 hour period 200 mL 0     lisinopril-hydrochlorothiazide (PRINZIDE,ZESTORETIC) 20-25 MG per tablet Take 1 tablet by mouth daily.       METOPROLOL SUCCINATE ER PO Take 150 mg by mouth daily        nystatin (MYCOSTATIN) [id number] UNIT/ML suspension   0     order for DME Equipment being ordered:    Trach supplies:   Tracheostomy tube 6 mm XLT  cuffless (dispense 1) with disposable inner cannulas (dispense 40) Ref number [id number]  Thermovent Heat and moisture exchanger, ref [id number] (dispense 40)  14 French suction catheters for trach suctioning (dispense 1 Case (50) )      Treatment Diagnosis: Papilloma tracheal papillomatosis 1 Month 11     order for DME Equipment being ordered: Nebulizer 1 Device 0     order for DME Equipment being ordered: Suction machine and suction catheters for trach. Please dispense 1 suction machine and 30 catheters with 3 refills 30 Device 3     doxycycline hyclate (VIBRA-TABS) 100 MG tablet TAKE ONE TABLET BY MOUTH TWICE A DAY       HYDROcodone-acetaminophen (NORCO) 5-325 MG per tablet Take 1-2 tablets by mouth every 4 hours as needed for other (Moderate to Severe Pain) (Patient not taking: Reported on 12/4/2018) 50 tablet 0         PHYSICAL EXAMINATION:  He  is awake, alert and in no apparent distress.    His tympanic membranes are clear and intact bilaterally. External auditory canals are clear.  Nasal exam shows a mild septal deviation without obstruction.  Examination of the oral cavity shows no suspicious lesions.  There is symmetric movement of the tongue and soft palate.    The oropharynx is clear.  His neck is supple without significant adenopathy. There is a Shiley  XLT tracheotomy tube in place. The stoma is clear. There are postsurgical and radiation changes in his neck which are well healed. Pulse is regular.  Upper airway is resticted.  Cranial nerves II-XII are grossly intact.           PROCEDURE: A flexible laryngoscopy  was performed.  Informed consent was obtained and a time out was performed. 3% lidocaine and 0.25% phenylephrine was sprayed into the nasal cavity and allowed 3 to 5 minutes for effect. The scope was passed through the right sided nostril.  Examination of the base of tongue showed no obvious papillomatous lesions at the base of tongue or hypopharynx.  There are radiation changes of the larynx.  There  is minimal mobility of the vocal folds and with edematous changes the airway is restricted.  There are thickened secretions in the hypopharynx but no gross pooling of saliva is seen.     Hypopharynx      Larynx with post radiation edema          The scope was then passed through his tracheostomy stoma both with the trach tube in place.  The trachea distal to the tube is clear of papillomatous lesions.  When the tube was pulled proximally a moderate amount of papillomas was seen on theposterior aspect of the tracheal wall.   There is collapse of the trachea in an A-frame configuration down to the level of the tracheotomy tube. No distal tracheal papillomatous lesions were seen.    Papillomas proximal to distal tip of tracheostomy tube      IMPRESSION/PLAN: Is doing well from a oropharyngeal standpoint in terms of his papillomas and is currently undergoing chemotherapy R has completed chemotherapy for his cancer and I is now in observation.  He is not followed up with a head neck surgical oncologist and I did bring Dr. Larson into the room and he arranged follow-up at [facility] for him in approximately 6 weeks.  His tracheal papillomas are not affecting his airway at this time.  After thorough discussion with Dr. Casillas, myself and Mr. Joshi we decided to have him follow-up with Dr. Casillas at [facility].  Should the tracheal papillomas become bothersome either Dr. Casillas or myself can address these in the operating room.

## 2021-05-28 ENCOUNTER — RECORDS - HEALTHEAST (OUTPATIENT)
Dept: ADMINISTRATIVE | Facility: CLINIC | Age: 66
End: 2021-05-28

## 2023-01-26 NOTE — TELEPHONE ENCOUNTER
FUTURE VISIT INFORMATION      FUTURE VISIT INFORMATION:    Date: 4/25/23    Time: 11am    [place] [facility] [place]ERR[place] INFORMATION:    Referring provider:      Referring providers clinic:      Reason for visit/diagnosis  New per pt, last saw Dr. Tirado 2018, pt having a hard time breathing-believes the cancer is back, he has papillomas, has had 70 surgeries, recs in [facility]    RECORDS REQUESTED FROM:       Clinic name Comments Records Status Imaging Status   [facility] ENT 12/4/18, 11/28/17- note with Dr. Tirado   4/13/18- Laryngoscopy  [facility]  1/4/17- ED [facility]     Imaging  1/6/17- Us biopsy fna  12/21/16- CT neck   1/4/16- CT Neck  [facility] 11/10/22- note with Dr. Salazar [facility]

## 2023-04-25 ENCOUNTER — PRE VISIT (OUTPATIENT)
Dept: OTOLARYNGOLOGY | Facility: CLINIC | Age: 68
End: 2023-04-25

## (undated) DEVICE — SPONGE COTTONOID 1/2X1/2" 80-1400

## (undated) DEVICE — KIT ENDO FIRST STEP DISINFECTANT 200ML W/POUCH EP-4

## (undated) DEVICE — GLOVE PROTEXIS POWDER FREE SMT 8.0  2D72PT80X

## (undated) DEVICE — TUBING SUCTION MEDI-VAC SOFT 3/16"X20' N520A

## (undated) DEVICE — ANTIFOG SOLUTION W/FOAM PAD CF-1001

## (undated) DEVICE — SUCTION MANIFOLD DORNOCH ULTRA CART UL-CL500

## (undated) DEVICE — DRAPE SPLIT SHEET 77X108 REINFORCED 29436

## (undated) DEVICE — ANTIFOG SOLUTION W/FOAM PAD 31142527

## (undated) DEVICE — ENDO VALVE BX EVIS MAJ-210

## (undated) DEVICE — SYR PISTON URETHRAL 60ML 68000

## (undated) DEVICE — ENDO TOOTH QUICK GUARD CONFORMING PROTECTION

## (undated) DEVICE — ENDO VALVE SUCTION BRONCH EVIS MAJ-209

## (undated) DEVICE — Device

## (undated) DEVICE — BLADE TRICUT LARYN 4MMX22CM W/IRRIGATION  1884030HRE

## (undated) DEVICE — PACK ENT ENDOSCOPY UMMC

## (undated) DEVICE — NDL BUTTERFLY 25GA .75" 367298

## (undated) DEVICE — ENDO ADPT BRONCH SWIVEL Y A1002

## (undated) DEVICE — EYE DRSG PAD OVAL

## (undated) DEVICE — SYR 10ML SLIP TIP W/O NDL 303134

## (undated) DEVICE — SOL NACL 0.9% IRRIG 1000ML BOTTLE 2F7124

## (undated) DEVICE — LINEN TOWEL PACK X5 5464

## (undated) RX ORDER — PROPOFOL 10 MG/ML
INJECTION, EMULSION INTRAVENOUS
Status: DISPENSED
Start: 2018-04-13

## (undated) RX ORDER — OXYMETAZOLINE HYDROCHLORIDE 0.05 G/100ML
SPRAY NASAL
Status: DISPENSED
Start: 2018-04-13

## (undated) RX ORDER — ONDANSETRON 2 MG/ML
INJECTION INTRAMUSCULAR; INTRAVENOUS
Status: DISPENSED
Start: 2018-04-13

## (undated) RX ORDER — FENTANYL CITRATE 50 UG/ML
INJECTION, SOLUTION INTRAMUSCULAR; INTRAVENOUS
Status: DISPENSED
Start: 2018-04-13

## (undated) RX ORDER — PHENYLEPHRINE HCL IN 0.9% NACL 1 MG/10 ML
SYRINGE (ML) INTRAVENOUS
Status: DISPENSED
Start: 2018-04-13

## (undated) RX ORDER — CEFAZOLIN SODIUM 1 G/3ML
INJECTION, POWDER, FOR SOLUTION INTRAMUSCULAR; INTRAVENOUS
Status: DISPENSED
Start: 2018-04-13

## (undated) RX ORDER — DEXAMETHASONE SODIUM PHOSPHATE 4 MG/ML
INJECTION, SOLUTION INTRA-ARTICULAR; INTRALESIONAL; INTRAMUSCULAR; INTRAVENOUS; SOFT TISSUE
Status: DISPENSED
Start: 2018-04-13

## (undated) RX ORDER — GLYCOPYRROLATE 0.2 MG/ML
INJECTION, SOLUTION INTRAMUSCULAR; INTRAVENOUS
Status: DISPENSED
Start: 2018-04-13

## (undated) RX ORDER — LIDOCAINE HYDROCHLORIDE 20 MG/ML
INJECTION, SOLUTION EPIDURAL; INFILTRATION; INTRACAUDAL; PERINEURAL
Status: DISPENSED
Start: 2018-04-13

## (undated) RX ORDER — TRIAMCINOLONE ACETONIDE 40 MG/ML
INJECTION, SUSPENSION INTRA-ARTICULAR; INTRAMUSCULAR
Status: DISPENSED
Start: 2018-04-13